# Patient Record
Sex: FEMALE | Race: WHITE | NOT HISPANIC OR LATINO
[De-identification: names, ages, dates, MRNs, and addresses within clinical notes are randomized per-mention and may not be internally consistent; named-entity substitution may affect disease eponyms.]

---

## 2017-02-22 ENCOUNTER — RECORD ABSTRACTING (OUTPATIENT)
Age: 82
End: 2017-02-22

## 2017-02-22 DIAGNOSIS — M17.9 OSTEOARTHRITIS OF KNEE, UNSPECIFIED: ICD-10-CM

## 2017-02-22 DIAGNOSIS — I10 ESSENTIAL (PRIMARY) HYPERTENSION: ICD-10-CM

## 2017-02-22 DIAGNOSIS — Z80.9 FAMILY HISTORY OF MALIGNANT NEOPLASM, UNSPECIFIED: ICD-10-CM

## 2017-02-22 DIAGNOSIS — Z78.9 OTHER SPECIFIED HEALTH STATUS: ICD-10-CM

## 2017-02-22 DIAGNOSIS — Z47.1 AFTERCARE FOLLOWING JOINT REPLACEMENT SURGERY: ICD-10-CM

## 2017-02-22 RX ORDER — POTASSIUM CHLORIDE 600 MG/1
TABLET, FILM COATED, EXTENDED RELEASE ORAL
Refills: 0 | Status: ACTIVE | COMMUNITY

## 2017-02-22 RX ORDER — ATORVASTATIN CALCIUM 80 MG/1
TABLET, FILM COATED ORAL
Refills: 0 | Status: ACTIVE | COMMUNITY

## 2017-02-22 RX ORDER — LEVOTHYROXINE SODIUM 0.17 MG/1
TABLET ORAL
Refills: 0 | Status: ACTIVE | COMMUNITY

## 2017-02-22 RX ORDER — CHROMIUM 200 MCG
TABLET ORAL
Refills: 0 | Status: ACTIVE | COMMUNITY

## 2017-02-22 RX ORDER — ATENOLOL 50 MG/1
TABLET ORAL
Refills: 0 | Status: ACTIVE | COMMUNITY

## 2017-02-22 RX ORDER — LOSARTAN POTASSIUM AND HYDROCHLOROTHIAZIDE 100; 12.5 MG/1; MG/1
TABLET, FILM COATED ORAL
Refills: 0 | Status: ACTIVE | COMMUNITY

## 2017-03-03 ENCOUNTER — APPOINTMENT (OUTPATIENT)
Dept: ORTHOPEDIC SURGERY | Facility: CLINIC | Age: 82
End: 2017-03-03

## 2017-09-29 ENCOUNTER — OTHER (OUTPATIENT)
Age: 82
End: 2017-09-29

## 2017-09-29 ENCOUNTER — APPOINTMENT (OUTPATIENT)
Dept: ORTHOPEDIC SURGERY | Facility: CLINIC | Age: 82
End: 2017-09-29
Payer: MEDICARE

## 2017-09-29 VITALS — WEIGHT: 170 LBS | BODY MASS INDEX: 26.68 KG/M2 | HEIGHT: 67 IN

## 2017-09-29 DIAGNOSIS — Z96.653 PRESENCE OF ARTIFICIAL KNEE JOINT, BILATERAL: ICD-10-CM

## 2017-09-29 DIAGNOSIS — Z60.2 PROBLEMS RELATED TO LIVING ALONE: ICD-10-CM

## 2017-09-29 DIAGNOSIS — Z86.39 PERSONAL HISTORY OF OTHER ENDOCRINE, NUTRITIONAL AND METABOLIC DISEASE: ICD-10-CM

## 2017-09-29 DIAGNOSIS — M17.0 BILATERAL PRIMARY OSTEOARTHRITIS OF KNEE: ICD-10-CM

## 2017-09-29 DIAGNOSIS — Z78.9 OTHER SPECIFIED HEALTH STATUS: ICD-10-CM

## 2017-09-29 DIAGNOSIS — Z87.39 PERSONAL HISTORY OF OTHER DISEASES OF THE MUSCULOSKELETAL SYSTEM AND CONNECTIVE TISSUE: ICD-10-CM

## 2017-09-29 PROCEDURE — 99213 OFFICE O/P EST LOW 20 MIN: CPT

## 2017-09-29 PROCEDURE — 73562 X-RAY EXAM OF KNEE 3: CPT | Mod: 50

## 2017-09-29 RX ORDER — GABAPENTIN 100 MG/1
100 CAPSULE ORAL
Qty: 120 | Refills: 0 | Status: ACTIVE | COMMUNITY
Start: 2017-09-06

## 2017-09-29 RX ORDER — MECLIZINE HYDROCHLORIDE 25 MG/1
25 TABLET ORAL
Qty: 30 | Refills: 0 | Status: ACTIVE | COMMUNITY
Start: 2017-09-25

## 2017-09-29 RX ORDER — POTASSIUM CITRATE 10 MEQ/1
10 MEQ TABLET, EXTENDED RELEASE ORAL
Qty: 200 | Refills: 0 | Status: ACTIVE | COMMUNITY
Start: 2017-09-27

## 2017-09-29 RX ORDER — PREDNISOLONE ACETATE 10 MG/ML
1 SUSPENSION/ DROPS OPHTHALMIC
Qty: 5 | Refills: 0 | Status: ACTIVE | COMMUNITY
Start: 2017-09-17

## 2017-09-29 RX ORDER — TIMOLOL MALEATE 5 MG/ML
0.5 SOLUTION OPHTHALMIC
Qty: 5 | Refills: 0 | Status: ACTIVE | COMMUNITY
Start: 2016-12-06

## 2017-09-29 RX ORDER — LOSARTAN POTASSIUM AND HYDROCHLOROTHIAZIDE 25; 100 MG/1; MG/1
100-25 TABLET ORAL
Qty: 90 | Refills: 0 | Status: ACTIVE | COMMUNITY
Start: 2017-09-19

## 2017-09-29 RX ORDER — POTASSIUM CHLORIDE 750 MG/1
10 TABLET, FILM COATED, EXTENDED RELEASE ORAL
Qty: 90 | Refills: 0 | Status: ACTIVE | COMMUNITY
Start: 2016-06-23

## 2017-09-29 RX ORDER — ATORVASTATIN CALCIUM 40 MG/1
40 TABLET, FILM COATED ORAL
Qty: 90 | Refills: 0 | Status: ACTIVE | COMMUNITY
Start: 2017-05-30

## 2017-09-29 SDOH — SOCIAL STABILITY - SOCIAL INSECURITY: PROBLEMS RELATED TO LIVING ALONE: Z60.2

## 2018-08-02 ENCOUNTER — OUTPATIENT (OUTPATIENT)
Dept: OUTPATIENT SERVICES | Facility: HOSPITAL | Age: 83
LOS: 1 days | Discharge: HOME | End: 2018-08-02
Payer: MEDICARE

## 2018-08-02 DIAGNOSIS — M54.5 LOW BACK PAIN: ICD-10-CM

## 2018-08-02 DIAGNOSIS — Z96.653 PRESENCE OF ARTIFICIAL KNEE JOINT, BILATERAL: Chronic | ICD-10-CM

## 2018-08-02 DIAGNOSIS — Z98.89 OTHER SPECIFIED POSTPROCEDURAL STATES: Chronic | ICD-10-CM

## 2018-08-02 DIAGNOSIS — Z90.5 ACQUIRED ABSENCE OF KIDNEY: Chronic | ICD-10-CM

## 2018-08-02 PROCEDURE — 72100 X-RAY EXAM L-S SPINE 2/3 VWS: CPT | Mod: 26

## 2018-08-21 ENCOUNTER — OUTPATIENT (OUTPATIENT)
Dept: OUTPATIENT SERVICES | Facility: HOSPITAL | Age: 83
LOS: 1 days | Discharge: HOME | End: 2018-08-21
Payer: MEDICARE

## 2018-08-21 ENCOUNTER — APPOINTMENT (OUTPATIENT)
Dept: CT IMAGING | Facility: HOSPITAL | Age: 83
End: 2018-08-21

## 2018-08-21 DIAGNOSIS — Z98.89 OTHER SPECIFIED POSTPROCEDURAL STATES: Chronic | ICD-10-CM

## 2018-08-21 DIAGNOSIS — Z96.653 PRESENCE OF ARTIFICIAL KNEE JOINT, BILATERAL: Chronic | ICD-10-CM

## 2018-08-21 DIAGNOSIS — M54.41 LUMBAGO WITH SCIATICA, RIGHT SIDE: ICD-10-CM

## 2018-08-21 DIAGNOSIS — Z90.5 ACQUIRED ABSENCE OF KIDNEY: Chronic | ICD-10-CM

## 2018-08-21 PROCEDURE — 72131 CT LUMBAR SPINE W/O DYE: CPT | Mod: 26

## 2018-11-09 ENCOUNTER — APPOINTMENT (OUTPATIENT)
Dept: ORTHOPEDIC SURGERY | Facility: CLINIC | Age: 83
End: 2018-11-09

## 2018-12-11 ENCOUNTER — APPOINTMENT (OUTPATIENT)
Dept: NEUROLOGY | Facility: CLINIC | Age: 83
End: 2018-12-11
Payer: MEDICARE

## 2018-12-11 VITALS — HEART RATE: 70 BPM | SYSTOLIC BLOOD PRESSURE: 187 MMHG | DIASTOLIC BLOOD PRESSURE: 109 MMHG | HEIGHT: 67 IN

## 2018-12-11 VITALS — SYSTOLIC BLOOD PRESSURE: 178 MMHG | DIASTOLIC BLOOD PRESSURE: 90 MMHG

## 2018-12-11 DIAGNOSIS — M54.17 RADICULOPATHY, LUMBOSACRAL REGION: ICD-10-CM

## 2018-12-11 PROCEDURE — 99204 OFFICE O/P NEW MOD 45 MIN: CPT

## 2018-12-11 RX ORDER — TIZANIDINE 2 MG/1
2 TABLET ORAL
Qty: 30 | Refills: 5 | Status: ACTIVE | COMMUNITY
Start: 2018-12-11 | End: 1900-01-01

## 2019-01-02 ENCOUNTER — APPOINTMENT (OUTPATIENT)
Dept: ORTHOPEDIC SURGERY | Facility: CLINIC | Age: 84
End: 2019-01-02

## 2019-02-19 ENCOUNTER — RX RENEWAL (OUTPATIENT)
Age: 84
End: 2019-02-19

## 2019-02-19 RX ORDER — CYCLOBENZAPRINE HYDROCHLORIDE 5 MG/1
5 TABLET, FILM COATED ORAL
Qty: 60 | Refills: 1 | Status: ACTIVE | COMMUNITY
Start: 2018-12-18 | End: 1900-01-01

## 2019-04-15 ENCOUNTER — APPOINTMENT (OUTPATIENT)
Dept: CARDIOLOGY | Facility: CLINIC | Age: 84
End: 2019-04-15

## 2019-10-02 PROBLEM — Z60.2 PERSON LIVING ALONE: Status: ACTIVE | Noted: 2017-09-29

## 2020-07-06 ENCOUNTER — APPOINTMENT (OUTPATIENT)
Dept: ORTHOPEDIC SURGERY | Facility: CLINIC | Age: 85
End: 2020-07-06
Payer: MEDICARE

## 2020-07-06 VITALS — BODY MASS INDEX: 27 KG/M2 | HEIGHT: 66 IN | WEIGHT: 168 LBS

## 2020-07-06 DIAGNOSIS — M54.42 LUMBAGO WITH SCIATICA, LEFT SIDE: ICD-10-CM

## 2020-07-06 DIAGNOSIS — G89.29 LUMBAGO WITH SCIATICA, LEFT SIDE: ICD-10-CM

## 2020-07-06 DIAGNOSIS — M25.552 PAIN IN LEFT HIP: ICD-10-CM

## 2020-07-06 DIAGNOSIS — M54.41 LUMBAGO WITH SCIATICA, LEFT SIDE: ICD-10-CM

## 2020-07-06 PROCEDURE — 73502 X-RAY EXAM HIP UNI 2-3 VIEWS: CPT | Mod: LT

## 2020-07-06 PROCEDURE — 72100 X-RAY EXAM L-S SPINE 2/3 VWS: CPT

## 2020-07-06 PROCEDURE — 99214 OFFICE O/P EST MOD 30 MIN: CPT

## 2020-07-06 NOTE — HISTORY OF PRESENT ILLNESS
[de-identified] : This is a very nice 85 year old woman experiencing left hip pain x one week, moderate in intensity. She is referred by Dr Escudero (PCP).  She is accompanied by her son for today's visit.  She denies any injury or trauma.  She has a known history of severe spinal stenosis with right lower extremity radiculopathy.  She states that the pain began approximately 1 week ago and is severe pain in the left groin radiation to the buttock and inner thigh to the knee.  She has been taking Tylenol #3 which has helped.  The pain is actually 100% relief resolved today. The pain is exacerbated by sitting in a recliner.  Twisting motions do not reproduce her pain.  Lying flat does help.    Medications she has tried include: Tylenol with codeine with relief.  She has not tried any physical therapy for this yet. She has been using a walker for balance issues, primarily outside  the house.  She has not had a prior injections into the hip.   She denies numbness and tingling in the extremity. The pain substantially limits activities of daily living. Walking tolerance is reduced.

## 2020-07-06 NOTE — PHYSICAL EXAM
[de-identified] : AP and lateral x-rays of the left hip, pelvis, and femur were ordered and taken in the office and demonstrate no evidence of degenerative joint disease of the hip with maintained joint space and no evidence of fractures or other intraarticular pathology.\par \par AP and lateral radiographs of the lumbar spine were ordered and obtained the office demonstrate severe degenerative disc disease of the lumbar spine but no evidence of spondylolysis or spondylolisthesis. [de-identified] : Patient is well nourished, well-developed, in no acute distress, with appropriate mood and affect. The patient is oriented to time, place, and person. Respirations are even and unlabored. Gait evaluation does not reveal a limp. There is no inguinal adenopathy. Examination of the contralateral hip shows normal range of motion, strength, no tenderness, and intact skin. The affected limb is well-perfused and showed 2+ dp/pt pulses, without skin lesions, shows a grossly normal motor and sensory examination. Examination of the hip shows no skin lesions. Hip motion is full and painless from 0-90 degrees extension to flexion, 20 degrees adduction and 20 degrees abduction, and 15 degrees internal and 30 degrees external rotation. Leg lengths are approximately equal. FADIR is negative and MARLON is negative. Stinchfield test is negative. Both hips are stable and muscle strength is normal with good strength with resisted abduction and adduction. Pedal pulses are palpable.  Examination of the lumbar spine reveals full range of motion without pain.  Mild tenderness palpation about the left paraspinal region.  There is no tenderness to palpation of the osseous structures or paravertebral soft tissues. There is no muscle spasm. Straight leg raise is negative bilaterally.\par \par

## 2020-07-06 NOTE — DISCUSSION/SUMMARY
[de-identified] : This patient has left lower extremity radiculopathy secondary to severe spinal stenosis.  Her left hip examination and radiographs are normal.  The patient is not an appropriate candidate for surgical intervention at this time. An extensive discussion was conducted on the natural history of the disease and the variety of surgical and non-surgical options available to the patient including, but not limited to non-steroidal anti-inflammatory medications, steroid injections, physical therapy, maintenance of ideal body weight, and reduction of activity.  Referred to physiatry for further management of her bilateral lower extremity radiculopathy.  She they will consider epidural steroid injections or selective nerve root injections.  The patient will schedule an appointment as needed.\par 
emani all pertinent systems normal

## 2020-07-17 ENCOUNTER — APPOINTMENT (OUTPATIENT)
Dept: RADIOLOGY | Facility: CLINIC | Age: 85
End: 2020-07-17
Payer: MEDICARE

## 2020-07-17 ENCOUNTER — OUTPATIENT (OUTPATIENT)
Dept: OUTPATIENT SERVICES | Facility: HOSPITAL | Age: 85
LOS: 1 days | End: 2020-07-17
Payer: MEDICARE

## 2020-07-17 ENCOUNTER — APPOINTMENT (OUTPATIENT)
Dept: CT IMAGING | Facility: CLINIC | Age: 85
End: 2020-07-17
Payer: MEDICARE

## 2020-07-17 DIAGNOSIS — Z98.89 OTHER SPECIFIED POSTPROCEDURAL STATES: Chronic | ICD-10-CM

## 2020-07-17 DIAGNOSIS — Z90.5 ACQUIRED ABSENCE OF KIDNEY: Chronic | ICD-10-CM

## 2020-07-17 DIAGNOSIS — Z00.8 ENCOUNTER FOR OTHER GENERAL EXAMINATION: ICD-10-CM

## 2020-07-17 DIAGNOSIS — Z96.653 PRESENCE OF ARTIFICIAL KNEE JOINT, BILATERAL: Chronic | ICD-10-CM

## 2020-07-17 PROCEDURE — 72131 CT LUMBAR SPINE W/O DYE: CPT | Mod: 26

## 2020-07-17 PROCEDURE — 72131 CT LUMBAR SPINE W/O DYE: CPT

## 2020-07-17 PROCEDURE — 73521 X-RAY EXAM HIPS BI 2 VIEWS: CPT | Mod: 26

## 2020-07-17 PROCEDURE — 73521 X-RAY EXAM HIPS BI 2 VIEWS: CPT

## 2021-02-25 ENCOUNTER — FORM ENCOUNTER (OUTPATIENT)
Age: 86
End: 2021-02-25

## 2021-02-26 ENCOUNTER — TRANSCRIPTION ENCOUNTER (OUTPATIENT)
Age: 86
End: 2021-02-26

## 2021-02-27 ENCOUNTER — OUTPATIENT (OUTPATIENT)
Dept: OUTPATIENT SERVICES | Facility: HOSPITAL | Age: 86
LOS: 1 days | End: 2021-02-27
Payer: MEDICARE

## 2021-02-27 ENCOUNTER — APPOINTMENT (OUTPATIENT)
Dept: DISASTER EMERGENCY | Facility: HOSPITAL | Age: 86
End: 2021-02-27

## 2021-02-27 VITALS
HEIGHT: 67 IN | OXYGEN SATURATION: 93 % | RESPIRATION RATE: 16 BRPM | SYSTOLIC BLOOD PRESSURE: 147 MMHG | HEART RATE: 75 BPM | DIASTOLIC BLOOD PRESSURE: 87 MMHG | WEIGHT: 160.28 LBS | TEMPERATURE: 98 F

## 2021-02-27 VITALS
SYSTOLIC BLOOD PRESSURE: 125 MMHG | HEART RATE: 78 BPM | OXYGEN SATURATION: 95 % | TEMPERATURE: 98 F | RESPIRATION RATE: 16 BRPM | DIASTOLIC BLOOD PRESSURE: 75 MMHG

## 2021-02-27 DIAGNOSIS — U07.1 COVID-19: ICD-10-CM

## 2021-02-27 DIAGNOSIS — Z90.5 ACQUIRED ABSENCE OF KIDNEY: Chronic | ICD-10-CM

## 2021-02-27 DIAGNOSIS — Z96.653 PRESENCE OF ARTIFICIAL KNEE JOINT, BILATERAL: Chronic | ICD-10-CM

## 2021-02-27 DIAGNOSIS — Z98.89 OTHER SPECIFIED POSTPROCEDURAL STATES: Chronic | ICD-10-CM

## 2021-02-27 PROCEDURE — M0239: CPT

## 2021-02-27 RX ORDER — SODIUM CHLORIDE 9 MG/ML
250 INJECTION INTRAMUSCULAR; INTRAVENOUS; SUBCUTANEOUS
Refills: 0 | Status: DISCONTINUED | OUTPATIENT
Start: 2021-02-27 | End: 2021-03-13

## 2021-02-27 RX ORDER — BAMLANIVIMAB 35 MG/ML
700 INJECTION, SOLUTION INTRAVENOUS ONCE
Refills: 0 | Status: COMPLETED | OUTPATIENT
Start: 2021-02-27 | End: 2021-02-27

## 2021-02-27 RX ADMIN — BAMLANIVIMAB 270 MILLIGRAM(S): 35 INJECTION, SOLUTION INTRAVENOUS at 08:48

## 2021-02-27 RX ADMIN — SODIUM CHLORIDE 25 MILLILITER(S): 9 INJECTION INTRAMUSCULAR; INTRAVENOUS; SUBCUTANEOUS at 08:49

## 2021-02-27 NOTE — CHART NOTE - NSCHARTNOTEFT_GEN_A_CORE
CC: Monoclonal Antibody Infusion/COVID 19 Positive  86yo Female with PMHx HTN, HLD, positive Covid 19 on 2/20 with symptoms of sore throat, cough, malaise referred by Nghia Gustafson for monoclonal antibody infusion.      exam/findings:  T(C): --  HR: --  BP: --  RR: --  SpO2: --    PE:   Appearance: NAD	  HEENT:   Normal oral mucosa,   Lymphatic: No lymphadenopathy  Cardiovascular: Normal S1 S2, No JVD, No murmurs, No edema  Respiratory: Lungs clear to auscultation	  Gastrointestinal:  Soft, Non-tender, + BS	  Skin: warm and dry  Neurologic: Non-focal  Extremities: Normal range of motion, no calf tenderness or edema    ASSESSMENT:  Pt is a 85 years old female, Covid 19 Positive referred by Dr. Duarte who presents to infusion center for Monoclonal antibody infusion Bamlanivimab.  Symptoms/ Criteria: sore throat, cough, malaise  Risk Profile includes: age >65    PLAN:  - infusion procedure explained to patient   -Consent for monoclonal antibody infusion obtained   - Risk & benefits discussed/all questions answered  -infuse Bamlanivimab 700mg IV over one hour   -observe patient for one hour post infusion    I have reviewed the Bamlanivimab Emergency Use Authorization (EAU) and I have provided the patient or patient's caregiver with the following information:  1. FDA has authorized emergency use of Bamlanivimab, which is not FDA-approved biologic product.  2. The patient or patient's caregiver has the option to accept or refuse administration of Bamlanivimab  3. The significant known and benefits are unknown.  4. Information on available alternative treatments and risks and benefits of those alternatives.    Discharge:  Patient tolerated infusion well denies complaints of chest pain/SOB/dizziness/ palps  Vital signs --- for discharge home at 1040  D/C instructions given/ fact sheet included.  Patient to follow-up with PCP as needed. CC: Monoclonal Antibody Infusion/COVID 19 Positive  84yo Female with PMHx HTN, HLD, positive Covid 19 on 2/20 with symptoms of sore throat, cough, malaise referred by Nghia Gustafson for monoclonal antibody infusion.      exam/findings:  Vital Signs Last 24 Hrs  T(C): 36.9 (27 Feb 2021 09:40), Max: 37.3 (27 Feb 2021 08:50)  T(F): 98.5 (27 Feb 2021 09:40), Max: 99.2 (27 Feb 2021 08:50)  HR: 75 (27 Feb 2021 09:40) (71 - 75)  BP: 112/61 (27 Feb 2021 09:40) (112/61 - 147/87)  RR: 17 (27 Feb 2021 09:40) (16 - 17)  SpO2: 95% (27 Feb 2021 09:40) (91% - 95%)    PE:   Appearance: NAD	  HEENT:   Normal oral mucosa,   Lymphatic: No lymphadenopathy  Cardiovascular: Normal S1 S2, No JVD, No murmurs, No edema  Respiratory: Lungs clear to auscultation	  Gastrointestinal:  Soft, Non-tender, + BS	  Skin: warm and dry  Neurologic: Non-focal  Extremities: Normal range of motion, no calf tenderness or edema    ASSESSMENT:  Pt is a 85 years old female, Covid 19 Positive referred by Dr. Duarte who presents to infusion center for Monoclonal antibody infusion Bamlanivimab.  Symptoms/ Criteria: sore throat, cough, malaise  Risk Profile includes: age >65    PLAN:  - infusion procedure explained to patient   -Consent for monoclonal antibody infusion obtained   - Risk & benefits discussed/all questions answered  -infuse Bamlanivimab 700mg IV over one hour   -observe patient for one hour post infusion    I have reviewed the Bamlanivimab Emergency Use Authorization (EAU) and I have provided the patient or patient's caregiver with the following information:  1. FDA has authorized emergency use of Bamlanivimab, which is not FDA-approved biologic product.  2. The patient or patient's caregiver has the option to accept or refuse administration of Bamlanivimab  3. The significant known and benefits are unknown.  4. Information on available alternative treatments and risks and benefits of those alternatives.    Discharge:  Patient tolerated infusion well denies complaints of chest pain/SOB/dizziness/ palps  Vital signs stable for discharge home at 1040  D/C instructions given/ fact sheet included.  Patient to follow-up with PCP as needed.

## 2021-02-28 ENCOUNTER — TRANSCRIPTION ENCOUNTER (OUTPATIENT)
Age: 86
End: 2021-02-28

## 2021-03-01 ENCOUNTER — TRANSCRIPTION ENCOUNTER (OUTPATIENT)
Age: 86
End: 2021-03-01

## 2021-03-06 ENCOUNTER — EMERGENCY (EMERGENCY)
Facility: HOSPITAL | Age: 86
LOS: 1 days | Discharge: HOME | End: 2021-03-06
Attending: EMERGENCY MEDICINE
Payer: MEDICARE

## 2021-03-06 VITALS
HEIGHT: 67 IN | HEART RATE: 82 BPM | WEIGHT: 160.06 LBS | SYSTOLIC BLOOD PRESSURE: 136 MMHG | OXYGEN SATURATION: 97 % | TEMPERATURE: 98 F | RESPIRATION RATE: 20 BRPM | DIASTOLIC BLOOD PRESSURE: 79 MMHG

## 2021-03-06 VITALS
DIASTOLIC BLOOD PRESSURE: 95 MMHG | SYSTOLIC BLOOD PRESSURE: 167 MMHG | OXYGEN SATURATION: 96 % | RESPIRATION RATE: 18 BRPM | HEART RATE: 88 BPM | TEMPERATURE: 98 F

## 2021-03-06 DIAGNOSIS — Z98.89 OTHER SPECIFIED POSTPROCEDURAL STATES: Chronic | ICD-10-CM

## 2021-03-06 DIAGNOSIS — Z96.653 PRESENCE OF ARTIFICIAL KNEE JOINT, BILATERAL: Chronic | ICD-10-CM

## 2021-03-06 DIAGNOSIS — Z90.5 ACQUIRED ABSENCE OF KIDNEY: Chronic | ICD-10-CM

## 2021-03-06 LAB
ALBUMIN SERPL ELPH-MCNC: 3.7 G/DL — SIGNIFICANT CHANGE UP (ref 3.3–5)
ALP SERPL-CCNC: 78 U/L — SIGNIFICANT CHANGE UP (ref 40–120)
ALT FLD-CCNC: 31 U/L — SIGNIFICANT CHANGE UP (ref 10–45)
ANION GAP SERPL CALC-SCNC: 11 MMOL/L — SIGNIFICANT CHANGE UP (ref 5–17)
AST SERPL-CCNC: 46 U/L — HIGH (ref 10–40)
BASOPHILS # BLD AUTO: 0.04 K/UL — SIGNIFICANT CHANGE UP (ref 0–0.2)
BASOPHILS NFR BLD AUTO: 0.5 % — SIGNIFICANT CHANGE UP (ref 0–2)
BILIRUB SERPL-MCNC: 0.3 MG/DL — SIGNIFICANT CHANGE UP (ref 0.2–1.2)
BUN SERPL-MCNC: 19 MG/DL — SIGNIFICANT CHANGE UP (ref 7–23)
CALCIUM SERPL-MCNC: 10.9 MG/DL — HIGH (ref 8.4–10.5)
CHLORIDE SERPL-SCNC: 103 MMOL/L — SIGNIFICANT CHANGE UP (ref 96–108)
CO2 SERPL-SCNC: 26 MMOL/L — SIGNIFICANT CHANGE UP (ref 22–31)
CREAT SERPL-MCNC: 1.04 MG/DL — SIGNIFICANT CHANGE UP (ref 0.5–1.3)
D DIMER BLD IA.RAPID-MCNC: 625 NG/ML DDU — HIGH
EOSINOPHIL # BLD AUTO: 0.16 K/UL — SIGNIFICANT CHANGE UP (ref 0–0.5)
EOSINOPHIL NFR BLD AUTO: 1.8 % — SIGNIFICANT CHANGE UP (ref 0–6)
GLUCOSE SERPL-MCNC: 118 MG/DL — HIGH (ref 70–99)
HCT VFR BLD CALC: 41.1 % — SIGNIFICANT CHANGE UP (ref 34.5–45)
HGB BLD-MCNC: 13.5 G/DL — SIGNIFICANT CHANGE UP (ref 11.5–15.5)
IMM GRANULOCYTES NFR BLD AUTO: 1.7 % — HIGH (ref 0–1.5)
LYMPHOCYTES # BLD AUTO: 0.85 K/UL — LOW (ref 1–3.3)
LYMPHOCYTES # BLD AUTO: 9.8 % — LOW (ref 13–44)
MAGNESIUM SERPL-MCNC: 1.7 MG/DL — SIGNIFICANT CHANGE UP (ref 1.6–2.6)
MCHC RBC-ENTMCNC: 31.9 PG — SIGNIFICANT CHANGE UP (ref 27–34)
MCHC RBC-ENTMCNC: 32.8 GM/DL — SIGNIFICANT CHANGE UP (ref 32–36)
MCV RBC AUTO: 97.2 FL — SIGNIFICANT CHANGE UP (ref 80–100)
MONOCYTES # BLD AUTO: 0.65 K/UL — SIGNIFICANT CHANGE UP (ref 0–0.9)
MONOCYTES NFR BLD AUTO: 7.5 % — SIGNIFICANT CHANGE UP (ref 2–14)
NEUTROPHILS # BLD AUTO: 6.82 K/UL — SIGNIFICANT CHANGE UP (ref 1.8–7.4)
NEUTROPHILS NFR BLD AUTO: 78.7 % — HIGH (ref 43–77)
NRBC # BLD: 0 /100 WBCS — SIGNIFICANT CHANGE UP (ref 0–0)
PLATELET # BLD AUTO: 362 K/UL — SIGNIFICANT CHANGE UP (ref 150–400)
POTASSIUM SERPL-MCNC: 4.4 MMOL/L — SIGNIFICANT CHANGE UP (ref 3.5–5.3)
POTASSIUM SERPL-SCNC: 4.4 MMOL/L — SIGNIFICANT CHANGE UP (ref 3.5–5.3)
PROT SERPL-MCNC: 7 G/DL — SIGNIFICANT CHANGE UP (ref 6–8.3)
RBC # BLD: 4.23 M/UL — SIGNIFICANT CHANGE UP (ref 3.8–5.2)
RBC # FLD: 14.3 % — SIGNIFICANT CHANGE UP (ref 10.3–14.5)
SODIUM SERPL-SCNC: 140 MMOL/L — SIGNIFICANT CHANGE UP (ref 135–145)
TROPONIN T, HIGH SENSITIVITY RESULT: 28 NG/L — SIGNIFICANT CHANGE UP (ref 0–51)
TROPONIN T, HIGH SENSITIVITY RESULT: 30 NG/L — SIGNIFICANT CHANGE UP (ref 0–51)
WBC # BLD: 8.67 K/UL — SIGNIFICANT CHANGE UP (ref 3.8–10.5)
WBC # FLD AUTO: 8.67 K/UL — SIGNIFICANT CHANGE UP (ref 3.8–10.5)

## 2021-03-06 PROCEDURE — 99285 EMERGENCY DEPT VISIT HI MDM: CPT | Mod: CS

## 2021-03-06 PROCEDURE — 93005 ELECTROCARDIOGRAM TRACING: CPT

## 2021-03-06 PROCEDURE — 71045 X-RAY EXAM CHEST 1 VIEW: CPT

## 2021-03-06 PROCEDURE — 93970 EXTREMITY STUDY: CPT | Mod: 26

## 2021-03-06 PROCEDURE — 99285 EMERGENCY DEPT VISIT HI MDM: CPT | Mod: 25

## 2021-03-06 PROCEDURE — 83735 ASSAY OF MAGNESIUM: CPT

## 2021-03-06 PROCEDURE — 85025 COMPLETE CBC W/AUTO DIFF WBC: CPT

## 2021-03-06 PROCEDURE — 80053 COMPREHEN METABOLIC PANEL: CPT

## 2021-03-06 PROCEDURE — 85379 FIBRIN DEGRADATION QUANT: CPT

## 2021-03-06 PROCEDURE — 36000 PLACE NEEDLE IN VEIN: CPT

## 2021-03-06 PROCEDURE — 84484 ASSAY OF TROPONIN QUANT: CPT

## 2021-03-06 PROCEDURE — 93970 EXTREMITY STUDY: CPT

## 2021-03-06 PROCEDURE — 71045 X-RAY EXAM CHEST 1 VIEW: CPT | Mod: 26

## 2021-03-06 PROCEDURE — 93010 ELECTROCARDIOGRAM REPORT: CPT

## 2021-03-06 RX ORDER — ASPIRIN/CALCIUM CARB/MAGNESIUM 324 MG
162 TABLET ORAL ONCE
Refills: 0 | Status: COMPLETED | OUTPATIENT
Start: 2021-03-06 | End: 2021-03-06

## 2021-03-06 RX ADMIN — Medication 162 MILLIGRAM(S): at 11:20

## 2021-03-06 NOTE — ED PROVIDER NOTE - PHYSICAL EXAMINATION
Federico JULIO MD PGY3:   PHYSICAL EXAM:    GENERAL: NAD, well-developed  HEENT:  Atraumatic, Normocephalic  CHEST/LUNG: Chest rise equal bilaterally. CTAB.   HEART: Regular rate and rhythm. No murmurs or rubs.   ABDOMEN: Soft, Nontender, Nondistended  EXTREMITIES:  2+ Peripheral Pulses.  PSYCH: A&Ox3  SKIN: No obvious rashes or lesions Federico JULIO MD PGY3:   PHYSICAL EXAM:    GENERAL: NAD, well-developed  HEENT:  Atraumatic, Normocephalic  CHEST/LUNG: Chest rise equal bilaterally. CTAB. No Increased WOB. No tachypnea.   HEART: Regular rate and rhythm. No murmurs or rubs.   ABDOMEN: Soft, Nontender, Nondistended  EXTREMITIES:  2+ Peripheral Pulses.  PSYCH: A&Ox3  SKIN: No obvious rashes or lesions

## 2021-03-06 NOTE — ED PROVIDER NOTE - OBJECTIVE STATEMENT
Federico JULIO MD PGY3: 85 F Federico JULIO MD PGY3: 85 F hx HTN, HLD known COVID+ two weeks ago here with worsening SOB x 2 days. No cough, no fever. States that the SOB is mild "like I just can't take a deep breath". No LE swelling, no hx of VTE. No nausea, vomiting. Tolerating PO well. Able to carry out ADLs but states "this is not normal for me".

## 2021-03-06 NOTE — ED PROVIDER NOTE - CLINICAL SUMMARY MEDICAL DECISION MAKING FREE TEXT BOX
Federico JULIO MD PGY3: 85 F recent COVID 2 weeks ago here for generalized SOB Federico JULIO MD PGY3: 85 F recent COVID 2 weeks ago here for generalized SOB with mild GARCIA, able to carry out ADLs. WIll obtain cardiac and PE workup for ACS, PE, PNA.  WIll reassess.

## 2021-03-06 NOTE — ED PROVIDER NOTE - PSH
S/P   x 4  S/P knee replacement, bilateral    S/p nephrectomy  left (  )  S/P tonsillectomy  in childhood

## 2021-03-06 NOTE — ED ADULT NURSE REASSESSMENT NOTE - NS ED NURSE REASSESS COMMENT FT1
Pt AAOx4, NAD, resp nonlabored, resting comfortably in bed maintained on cardiac monitor. Pt denies headache, dizziness, chest pain, palpitations, SOB, abd pain, n/v/d, urinary symptoms, fevers, chills, weakness at this time. Pt awaiting lab results. Safety maintained with call bell within reach.

## 2021-03-06 NOTE — ED PROVIDER NOTE - PATIENT PORTAL LINK FT
You can access the FollowMyHealth Patient Portal offered by [facility] by registering at the following website: [email or web address]. By joining [employer]’s FollowMyHealth portal, you will also be able to view your health information using other applications (apps) compatible with our system.

## 2021-03-06 NOTE — ED PROVIDER NOTE - ATTENDING CONTRIBUTION TO CARE
Private Physician Charlie Duarte PCP [facility]  85y female [facility] COVID 92/20/21) Hld, HTN,Hypothyrodism, Renal ca sp nephrectomy (L), lissy TKR, C-sec, tonsilectomy. Pt comes to ed c/o liang after receiving Monoclonal ab last week. Pt has liang walking around house. No fever,chills.cough,sputum,hemoptysis,cp,abd pain, nvdc,bodyaches. PE WDWN female awake alert nad , ncat neck supple chest clear anterior & posterior cv no rubs, gallops or murmurs neuro no focal defcts msk no swelling edema lower extr ttp.  Kodi Oden MD, Facep Private Physician 	Nghia NDIAYE [phone]  85y female [facility] COVID 92/20/21) Hld, HTN,Hypothyrodism, Renal ca sp nephrectomy (L), lissy TKR, C-sec, tonsilectomy. Pt comes to ed c/o liang after receiving Monoclonal ab last week. Pt has liang walking around house. No fever,chills.cough,sputum,hemoptysis,cp,abd pain, nvdc,bodyaches. PE WDWN female awake alert nad , ncat neck supple chest clear anterior & posterior cv no rubs, gallops or murmurs neuro no focal defcts msk no swelling edema lower extr ttp.  Kodi Oden MD, Facep

## 2021-03-06 NOTE — ED PROVIDER NOTE - PROGRESS NOTE DETAILS
Discussed with Private Physician not staff. does not refer to anyone  Kodi Oden MD, Facep ap-  pt signed out to me by Dr. Oden, spoke w/ patient regarding plan to obtain CTA, pt states she will not have a CTA due to concerns of having 1 kidney, discussed r/b of duplex bilateral lower legs, L>R leg for possible clot, pt w/ no active SOB, discussed plan for possible v/q scan, pt states that "I just want to go home" ap- spoke w/ jaqueline Caputo, pts son informed of diagnostic difficulty currently awaiting, duplex of bilateral lower extremity if negative; aware of need for v/q scan, José Miguel Daniels DO PGY3 - pt wants to leave before US officially read. Understands risks of ofelia gprior to official report. Will fu on result and call pt if any concern on read. DC

## 2021-03-06 NOTE — ED ADULT NURSE NOTE - NSIMPLEMENTINTERV_GEN_ALL_ED
Implemented All Fall Risk Interventions:  [place] to call system. Call bell, personal items and telephone within reach. Instruct patient to call for assistance. Room bathroom lighting operational. Non-slip footwear when patient is off stretcher. Physically safe environment: no spills, clutter or unnecessary equipment. Stretcher in lowest position, wheels locked, appropriate side rails in place. Provide visual cue, wrist band, yellow gown, etc. Monitor gait and stability. Monitor for mental status changes and reorient to person, place, and time. Review medications for side effects contributing to fall risk. Reinforce activity limits and safety measures with patient and family.

## 2021-03-06 NOTE — ED ADULT NURSE NOTE - NS ED NURSE RECORD ANOTHER VITAL SIGN
Complaint: requests water    Pain:  Pain Assessment  Pain Assessment: 0-10  Pain Level: 4  RASS Score: Drowsy - Patient awakens with sustained eye opening and eye contact    Reason for Referral  Marcelo Chance was referred for a bedside swallow evaluation to assess the efficiency of his swallow function, identify signs and symptoms of aspiration and make recommendations regarding safe dietary consistencies, effective compensatory strategies, and safe eating environment. Impression  Dysphagia Diagnosis: Mild to moderate oral stage dysphagia  Dysphagia Outcome Severity Scale: Level 5: Mild dysphagia- Distant supervision. May need one diet consistency restricted     Treatment Plan  Requires SLP Intervention: Yes  Duration/Frequency of Treatment: monitor 1-2x/LOS  D/C Recommendations: To be determined       Recommended Diet and Intervention  Diet Solids Recommendation: Dysphagia Minced and Moist (Dysphagia II)  Liquid Consistency Recommendation: Thin  Recommended Form of Meds: PO  Recommendations: Assist feed  Therapeutic Interventions: Diet tolerance monitoring;Patient/Family education; Therapeutic PO trials with SLP    Compensatory Swallowing Strategies  Compensatory Swallowing Strategies: Upright as possible for all oral intake;Eat/Feed slowly    Treatment/Goals  Short-term Goals  Timeframe for Short-term Goals: length of admission  Goal 1: Pt will tolerate dysphagia II diet/thin liquids with adequate oral manipulation and no s/s aspiration. Goal 2: Pt/caregivers will indicate understanding of all recommendations. General  Chart Reviewed: Yes  Behavior/Cognition: Alert;Confused; Cooperative; Requires cueing  Follows Directions: Simple  Dentition: Poor dental/oral hygeine  Patient Positioning: Upright in bed  Prior Dysphagia History: none known prior to admission  Consistencies Administered: Reg solid; Dysphagia Minced and Moist (Dysphagia II); Dysphagia Pureed (Dysphagia I); Thin - teaspoon; Thin - cup; Thin - straw;Ice Chips           Vision/Hearing  Vision  Vision: Within Functional Limits  Hearing  Hearing: Within functional limits    Oral Motor Deficits  Oral/Motor  Oral Motor: Exceptions to [facility]    Oral Phase Dysfunction  Oral Phase  Oral Phase: Exceptions     Indicators of Pharyngeal Phase Dysfunction   Pharyngeal Phase  Pharyngeal Phase: WFL    Prognosis  Prognosis  Prognosis for safe diet advancement: guarded  Individuals consulted  Consulted and agree with results and recommendations: Patient;RN    Education  Patient Education: results, recommendations  Patient Education Response: Needs reinforcement  Safety Devices in place: Yes  Type of devices:  All fall risk precautions in place       Therapy Time  SLP Individual Minutes  Time In: 6760  Time Out: 1400  Minutes: 600 [facility], [address] [place] [address] Road  3/16/2020 2:04 PM Yes

## 2021-03-06 NOTE — ED ADULT NURSE NOTE - OBJECTIVE STATEMENT
84 y/o female hx HTN, HLD, hypothyroidism, renal CA presents to the ED from home c/o GARCIA s/p Monoclonal infusion. Pt states received infusion last week, has had worsening GARCIA, recently COVID+ x 3 weeks ago, no longer having symptoms. Denies fever, chills, n/v, weakness, abd pain, diarrhea/constipation, numbness/tingling, urinary s/s. Pt A&Ox3, in no respiratory distress, no CP, well appearing, no increased WOB, no cough, sinus rhythm on cardiac monitor. PT safety maintained, call bell within reach and bed in the lowest position.

## 2021-03-06 NOTE — ED PROVIDER NOTE - NSFOLLOWUPINSTRUCTIONS_ED_ALL_ED_FT
Please follow up with your doctor this week for reevaluation    Return for any new/worsening symtpoms or any other concern to you    You are still pending your official ultrasound result - if there is any abnormal result we will call you with your result

## 2021-03-15 ENCOUNTER — APPOINTMENT (OUTPATIENT)
Dept: PULMONOLOGY | Facility: CLINIC | Age: 86
End: 2021-03-15
Payer: MEDICARE

## 2021-03-15 VITALS
TEMPERATURE: 98.2 F | DIASTOLIC BLOOD PRESSURE: 81 MMHG | HEART RATE: 78 BPM | OXYGEN SATURATION: 95 % | SYSTOLIC BLOOD PRESSURE: 170 MMHG

## 2021-03-15 DIAGNOSIS — Z86.39 PERSONAL HISTORY OF OTHER ENDOCRINE, NUTRITIONAL AND METABOLIC DISEASE: ICD-10-CM

## 2021-03-15 DIAGNOSIS — R25.1 TREMOR, UNSPECIFIED: ICD-10-CM

## 2021-03-15 DIAGNOSIS — U07.1 COVID-19: ICD-10-CM

## 2021-03-15 DIAGNOSIS — Z87.891 PERSONAL HISTORY OF NICOTINE DEPENDENCE: ICD-10-CM

## 2021-03-15 PROCEDURE — 99204 OFFICE O/P NEW MOD 45 MIN: CPT

## 2021-03-15 PROCEDURE — 99072 ADDL SUPL MATRL&STAF TM PHE: CPT

## 2021-03-15 NOTE — PHYSICAL EXAM
[No Acute Distress] : no acute distress [Well Nourished] : well nourished [Normal Rate/Rhythm] : normal rate/rhythm [Normal S1, S2] : normal s1, s2 [No Murmurs] : no murmurs [No Resp Distress] : no resp distress [No Acc Muscle Use] : no acc muscle use [Clear to Auscultation Bilaterally] : clear to auscultation bilaterally [Oriented x3] : oriented x3

## 2021-03-15 NOTE — PROCEDURE
[FreeTextEntry1] : \par EXAM:  DUPLEX SCAN EXT VEINS LOWER BI                      \par \par \par PROCEDURE DATE:  03/06/2021  \par \par \par \par \par INTERPRETATION:  CLINICAL INFORMATION: Bilateral lower extremity weakness, Covid positive.\par \par COMPARISON: None available.\par \par TECHNIQUE: Duplex sonography of the BILATERAL LOWER extremity veins with color and spectral Doppler, with and without compression.\par \par FINDINGS:\par \par There is normal compressibility of the bilateral common femoral, femoral and popliteal veins.\par Doppler examination shows normal spontaneous and phasic flow.\par Calf veins were not visualized.\par \par IMPRESSION:\par \par No evidence of deep venous thrombosis in either lower extremity though calf veins were not visualized.\par

## 2021-03-15 NOTE — HISTORY OF PRESENT ILLNESS
[Former] : former [TextBox_4] : JOVITA KITCHEN is a 85 year old female who presents for intermittent episodes of 'shaking inside" \par has been ongoing since prior to covid 19\par got Covid 19 in feb\par s/p MAB 2/27/21\par \par went to the [facility] ER last week- for the shaky feeling, cant take a deep breath\par s/p xray and US dopplers\par dimer was high\par pulse ox remained > 95%\par no cough, no wheezing\par never seen pulm\par former smoker\par \par PMH: HTN, HLD, history of nephrectomy for a 'mass'\par \par

## 2021-03-15 NOTE — ASSESSMENT
[FreeTextEntry1] : formal pfts\par son willl bring us the results of her PCR\par US dopplers and xray reviewed\par can repeat xray in future- to see if resolution in RLL base\par dimer was high\par will repeat it to make sure trendign down\par if it is not- may need VQ scan\par discussed with patient & son\par

## 2021-03-17 ENCOUNTER — NON-APPOINTMENT (OUTPATIENT)
Age: 86
End: 2021-03-17

## 2021-03-17 LAB — DEPRECATED D DIMER PPP IA-ACNC: 873 NG/ML DDU

## 2021-04-05 ENCOUNTER — APPOINTMENT (OUTPATIENT)
Dept: PULMONOLOGY | Facility: CLINIC | Age: 86
End: 2021-04-05

## 2021-04-27 ENCOUNTER — APPOINTMENT (OUTPATIENT)
Dept: CARDIOLOGY | Facility: CLINIC | Age: 86
End: 2021-04-27

## 2021-05-08 ENCOUNTER — INPATIENT (INPATIENT)
Facility: HOSPITAL | Age: 86
LOS: 4 days | Discharge: NURSING, REHAB OR LONG-TERM CARE | DRG: 872 | End: 2021-05-13
Attending: STUDENT IN AN ORGANIZED HEALTH CARE EDUCATION/TRAINING PROGRAM | Admitting: HOSPITALIST
Payer: MEDICARE

## 2021-05-08 VITALS
HEIGHT: 67 IN | SYSTOLIC BLOOD PRESSURE: 120 MMHG | TEMPERATURE: 97 F | WEIGHT: 160.06 LBS | RESPIRATION RATE: 20 BRPM | OXYGEN SATURATION: 99 % | HEART RATE: 97 BPM | DIASTOLIC BLOOD PRESSURE: 78 MMHG

## 2021-05-08 DIAGNOSIS — R19.7 DIARRHEA, UNSPECIFIED: ICD-10-CM

## 2021-05-08 DIAGNOSIS — Z98.89 OTHER SPECIFIED POSTPROCEDURAL STATES: Chronic | ICD-10-CM

## 2021-05-08 DIAGNOSIS — I10 ESSENTIAL (PRIMARY) HYPERTENSION: ICD-10-CM

## 2021-05-08 DIAGNOSIS — E83.42 HYPOMAGNESEMIA: ICD-10-CM

## 2021-05-08 DIAGNOSIS — Z96.653 PRESENCE OF ARTIFICIAL KNEE JOINT, BILATERAL: Chronic | ICD-10-CM

## 2021-05-08 DIAGNOSIS — E78.00 PURE HYPERCHOLESTEROLEMIA, UNSPECIFIED: ICD-10-CM

## 2021-05-08 DIAGNOSIS — Z90.5 ACQUIRED ABSENCE OF KIDNEY: Chronic | ICD-10-CM

## 2021-05-08 DIAGNOSIS — N17.9 ACUTE KIDNEY FAILURE, UNSPECIFIED: ICD-10-CM

## 2021-05-08 DIAGNOSIS — Z29.9 ENCOUNTER FOR PROPHYLACTIC MEASURES, UNSPECIFIED: ICD-10-CM

## 2021-05-08 DIAGNOSIS — E03.9 HYPOTHYROIDISM, UNSPECIFIED: ICD-10-CM

## 2021-05-08 LAB
ALBUMIN SERPL ELPH-MCNC: 3.8 G/DL — SIGNIFICANT CHANGE UP (ref 3.3–5)
ALP SERPL-CCNC: 71 U/L — SIGNIFICANT CHANGE UP (ref 40–120)
ALT FLD-CCNC: 24 U/L — SIGNIFICANT CHANGE UP (ref 10–45)
ANION GAP SERPL CALC-SCNC: 15 MMOL/L — SIGNIFICANT CHANGE UP (ref 5–17)
AST SERPL-CCNC: 75 U/L — HIGH (ref 10–40)
BASOPHILS # BLD AUTO: 0 K/UL — SIGNIFICANT CHANGE UP (ref 0–0.2)
BASOPHILS NFR BLD AUTO: 0 % — SIGNIFICANT CHANGE UP (ref 0–2)
BILIRUB SERPL-MCNC: 0.7 MG/DL — SIGNIFICANT CHANGE UP (ref 0.2–1.2)
BUN SERPL-MCNC: 30 MG/DL — HIGH (ref 7–23)
CALCIUM SERPL-MCNC: 10.1 MG/DL — SIGNIFICANT CHANGE UP (ref 8.4–10.5)
CHLORIDE SERPL-SCNC: 102 MMOL/L — SIGNIFICANT CHANGE UP (ref 96–108)
CO2 SERPL-SCNC: 20 MMOL/L — LOW (ref 22–31)
CREAT SERPL-MCNC: 1.57 MG/DL — HIGH (ref 0.5–1.3)
EOSINOPHIL # BLD AUTO: 0.09 K/UL — SIGNIFICANT CHANGE UP (ref 0–0.5)
EOSINOPHIL NFR BLD AUTO: 1 % — SIGNIFICANT CHANGE UP (ref 0–6)
GAS PNL BLDV: SIGNIFICANT CHANGE UP
GLUCOSE SERPL-MCNC: 112 MG/DL — HIGH (ref 70–99)
HCT VFR BLD CALC: 41.7 % — SIGNIFICANT CHANGE UP (ref 34.5–45)
HGB BLD-MCNC: 13.7 G/DL — SIGNIFICANT CHANGE UP (ref 11.5–15.5)
LACTATE SERPL-SCNC: 1.6 MMOL/L — SIGNIFICANT CHANGE UP (ref 0.7–2)
LYMPHOCYTES # BLD AUTO: 0.69 K/UL — LOW (ref 1–3.3)
LYMPHOCYTES # BLD AUTO: 8 % — LOW (ref 13–44)
MAGNESIUM SERPL-MCNC: 1.5 MG/DL — LOW (ref 1.6–2.6)
MCHC RBC-ENTMCNC: 31.9 PG — SIGNIFICANT CHANGE UP (ref 27–34)
MCHC RBC-ENTMCNC: 32.9 GM/DL — SIGNIFICANT CHANGE UP (ref 32–36)
MCV RBC AUTO: 97.2 FL — SIGNIFICANT CHANGE UP (ref 80–100)
MONOCYTES # BLD AUTO: 1.29 K/UL — HIGH (ref 0–0.9)
MONOCYTES NFR BLD AUTO: 15 % — HIGH (ref 2–14)
NEUTROPHILS # BLD AUTO: 6.19 K/UL — SIGNIFICANT CHANGE UP (ref 1.8–7.4)
NEUTROPHILS NFR BLD AUTO: 40 % — LOW (ref 43–77)
PHOSPHATE SERPL-MCNC: 2.5 MG/DL — SIGNIFICANT CHANGE UP (ref 2.5–4.5)
PLATELET # BLD AUTO: 317 K/UL — SIGNIFICANT CHANGE UP (ref 150–400)
POTASSIUM SERPL-MCNC: 5.2 MMOL/L — SIGNIFICANT CHANGE UP (ref 3.5–5.3)
POTASSIUM SERPL-SCNC: 5.2 MMOL/L — SIGNIFICANT CHANGE UP (ref 3.5–5.3)
PROT SERPL-MCNC: 7 G/DL — SIGNIFICANT CHANGE UP (ref 6–8.3)
RBC # BLD: 4.29 M/UL — SIGNIFICANT CHANGE UP (ref 3.8–5.2)
RBC # FLD: 14.2 % — SIGNIFICANT CHANGE UP (ref 10.3–14.5)
SODIUM SERPL-SCNC: 137 MMOL/L — SIGNIFICANT CHANGE UP (ref 135–145)
WBC # BLD: 8.6 K/UL — SIGNIFICANT CHANGE UP (ref 3.8–10.5)
WBC # FLD AUTO: 8.6 K/UL — SIGNIFICANT CHANGE UP (ref 3.8–10.5)

## 2021-05-08 PROCEDURE — 99223 1ST HOSP IP/OBS HIGH 75: CPT

## 2021-05-08 PROCEDURE — 74176 CT ABD & PELVIS W/O CONTRAST: CPT | Mod: 26,MA

## 2021-05-08 PROCEDURE — 99285 EMERGENCY DEPT VISIT HI MDM: CPT | Mod: CS

## 2021-05-08 RX ORDER — ATORVASTATIN CALCIUM 80 MG/1
40 TABLET, FILM COATED ORAL AT BEDTIME
Refills: 0 | Status: DISCONTINUED | OUTPATIENT
Start: 2021-05-08 | End: 2021-05-13

## 2021-05-08 RX ORDER — LEVOTHYROXINE SODIUM 125 MCG
137 TABLET ORAL DAILY
Refills: 0 | Status: DISCONTINUED | OUTPATIENT
Start: 2021-05-08 | End: 2021-05-13

## 2021-05-08 RX ORDER — MAGNESIUM SULFATE 500 MG/ML
1 VIAL (ML) INJECTION ONCE
Refills: 0 | Status: DISCONTINUED | OUTPATIENT
Start: 2021-05-08 | End: 2021-05-08

## 2021-05-08 RX ORDER — MAGNESIUM SULFATE 500 MG/ML
1 VIAL (ML) INJECTION ONCE
Refills: 0 | Status: COMPLETED | OUTPATIENT
Start: 2021-05-08 | End: 2021-05-08

## 2021-05-08 RX ORDER — ACETAMINOPHEN 500 MG
650 TABLET ORAL EVERY 4 HOURS
Refills: 0 | Status: DISCONTINUED | OUTPATIENT
Start: 2021-05-08 | End: 2021-05-13

## 2021-05-08 RX ORDER — LOSARTAN POTASSIUM 100 MG/1
100 TABLET, FILM COATED ORAL DAILY
Refills: 0 | Status: DISCONTINUED | OUTPATIENT
Start: 2021-05-08 | End: 2021-05-13

## 2021-05-08 RX ORDER — CHOLECALCIFEROL (VITAMIN D3) 125 MCG
1000 CAPSULE ORAL DAILY
Refills: 0 | Status: DISCONTINUED | OUTPATIENT
Start: 2021-05-08 | End: 2021-05-13

## 2021-05-08 RX ORDER — SODIUM CHLORIDE 9 MG/ML
1000 INJECTION, SOLUTION INTRAVENOUS ONCE
Refills: 0 | Status: COMPLETED | OUTPATIENT
Start: 2021-05-08 | End: 2021-05-08

## 2021-05-08 RX ORDER — HEPARIN SODIUM 5000 [USP'U]/ML
5000 INJECTION INTRAVENOUS; SUBCUTANEOUS
Refills: 0 | Status: DISCONTINUED | OUTPATIENT
Start: 2021-05-08 | End: 2021-05-13

## 2021-05-08 RX ORDER — SODIUM CHLORIDE 9 MG/ML
1000 INJECTION, SOLUTION INTRAVENOUS
Refills: 0 | Status: COMPLETED | OUTPATIENT
Start: 2021-05-08 | End: 2021-05-09

## 2021-05-08 RX ADMIN — Medication 100 GRAM(S): at 21:51

## 2021-05-08 RX ADMIN — SODIUM CHLORIDE 1000 MILLILITER(S): 9 INJECTION, SOLUTION INTRAVENOUS at 21:30

## 2021-05-08 NOTE — ED PROVIDER NOTE - ATTENDING CONTRIBUTION TO CARE
I performed a history and physical exam of the patient and discussed their management with the resident. I reviewed the resident's note and agree with the documented findings and plan of care. My medical decision making and observations are found above.    87 y/o F with HTN, hypothyroidism, hyperlipidemia, recent COVID infection p/w diarrhea. States she has 4-5 episodes of explosive diarrhea, starting 4-5 days ago a/w diffuse weakness. No fevers, vomiting, recent travel/abx. On exam, nontoxic appearing, nad.  cv rrr no m/r/g, lungs ctabl no resp distress. abd soft, ntnd. 2+ distal pulses. 5/5 distal strength. nonfocal neuro exam. likely c/w gastroenteritis vs colitis. not c/w cdiff/infectious etiology. given benign abd exam, will defer imaging, but low thresshold to scan pending workup.

## 2021-05-08 NOTE — H&P ADULT - PROBLEM SELECTOR PLAN 2
afebrile and normotensive , however labs w/ bandemia   - f/u CT abdomen pelvis   - check Cdiff   - check GI pcr and ova/ parasites   - maintain contact isolation until cdiff negative or diarrhea resolves  - monitor and replete electrolytes

## 2021-05-08 NOTE — PATIENT PROFILE ADULT - DO YOU FEEL UNSAFE AT HOME, WORK, OR SCHOOL?
GA @ birth: 39.5  HC(cm): 32.5 (09-15) | Length(cm):Height (cm): 50 (09-16-21 @ 00:19) | Saskia weight % _____ | ADWG (g/day): _____    Current/Last Weight in grams: 2930 (09-16), 2930 (09-16)       no

## 2021-05-08 NOTE — H&P ADULT - NSHPREVIEWOFSYSTEMS_GEN_ALL_CORE
CONSTITUTIONAL:+  weakness, no fevers or chills  EYES/ENT: No visual changes;  No dysphagia  NECK: No pain or stiffness  RESPIRATORY: No cough, wheezing, hemoptysis; No shortness of breath  CARDIOVASCULAR: No chest pain or palpitations; No lower extremity edema  EXTREMITIES: no le edema, cyanosis, clubbing  GASTROINTESTINAL: No abdominal or epigastric pain. No nausea, vomiting, or hematemesis; + diarrhea  no constipation. No melena or hematochezia.  BACK: No back pain  GENITOURINARY: No dysuria, frequency or hematuria  NEUROLOGICAL: No numbness or weakness  MSK: no joint swelling or pain  SKIN: No itching, burning, rashes, or lesions   PSYCH: no agitation  All other review of systems is negative unless indicated above.

## 2021-05-08 NOTE — H&P ADULT - ASSESSMENT
86F w/HTN, hypothyroidism, hyperlipidemia, recent COVID infection ( late Feb ’21) ,  p/w diarrhea x 5 days

## 2021-05-08 NOTE — H&P ADULT - NSHPSOCIALHISTORY_GEN_ALL_CORE
Social History:    Lives with: ( x ) alone  (  ) children   (  ) spouse   (  ) parents  (  ) other    Substance Use (street drugs): ( x ) never used  (  ) other:  Tobacco Usage:  ( x  ) non smoker   Alcohol Usage: no etoh use

## 2021-05-08 NOTE — H&P ADULT - NSICDXPASTSURGICALHX_GEN_ALL_CORE_FT
PAST SURGICAL HISTORY:  S/P  x 4    S/P knee replacement, bilateral     S/p nephrectomy left (  )    S/P tonsillectomy in childhood

## 2021-05-08 NOTE — H&P ADULT - NSHPPHYSICALEXAM_GEN_ALL_CORE
Vital Signs Last 24 Hrs  T(C): 36.8 (08 May 2021 20:14), Max: 36.8 (08 May 2021 20:14)  T(F): 98.2 (08 May 2021 20:14), Max: 98.2 (08 May 2021 20:14)  HR: 89 (08 May 2021 20:15) (89 - 97)  BP: 138/69 (08 May 2021 20:15) (120/78 - 154/91)  BP(mean): --  RR: 20 (08 May 2021 20:15) (20 - 20)  SpO2: 100% (08 May 2021 20:15) (99% - 100%) Vital Signs Last 24 Hrs  T(C): 36.8 (08 May 2021 20:14), Max: 36.8 (08 May 2021 20:14)  T(F): 98.2 (08 May 2021 20:14), Max: 98.2 (08 May 2021 20:14)  HR: 89 (08 May 2021 20:15) (89 - 97)  BP: 138/69 (08 May 2021 20:15) (120/78 - 154/91)  BP(mean): --  RR: 20 (08 May 2021 20:15) (20 - 20)  SpO2: 100% (08 May 2021 20:15) (99% - 100%)    GENERAL: No acute distress, well-developed  HEAD:  Atraumatic, Normocephalic  ENT: EOMI, PERRLA, conjunctiva and sclera clear,  moist mucosa no pharyngeal erythema or exudates   NECK: supple , no JVD   CHEST/LUNG: Clear to auscultation bilaterally; No wheeze, equal breath sounds bilaterally   BACK: No spinal tenderness,  No CVA tenderness   HEART: Regular rate and rhythm; No murmurs, rubs, or gallops  ABDOMEN: Soft, Nontender, Nondistended; Bowel sounds present  EXTREMITIES:  No clubbing, cyanosis, or edema  MSK: No joint swelling or effusions, ROM intact   PSYCH: Normal behavior/affect  NEUROLOGY: resting tremor on left had , AAOx3, non-focal, cranial nerves intact  SKIN: Normal color, No rashes or lesions

## 2021-05-08 NOTE — ED ADULT NURSE NOTE - OBJECTIVE STATEMENT
patient is 86 year old female with a PMH of HTN, HLD who presents to the ED from home complaining of diarrhea and weakness x 4 days. patient states she has been having multiple episodes of diarrhea and now is complaining of generalized weakness. patient states she lives alone but the past few days has had no ability to ambulate due to weakness so family has been helping her. patient denies blood in diarrhea at this time. patient is aaox3, lungs CTA bilaterally, abd soft, nondistended, nontender, cap refill <3, radial pulses +2 bilaterally. patient denies chest pain, shortness of breath, ha, dizziness, weakness, numbness or tingling, fever, chills, cough, n/v, abd pain, back pain, changes in bowel or bladder, hematuria, bloody stool. patient resting on stretcher. will continue to monitor. IV placed.

## 2021-05-08 NOTE — H&P ADULT - NSHPLABSRESULTS_GEN_ALL_CORE
Labs personally reviewed:                          13.7   8.60  )-----------( 317      ( 08 May 2021 20:49 )             41.7     05-08    137  |  102  |  30<H>  ----------------------------<  112<H>  5.2   |  20<L>  |  1.57<H>    Ca    10.1      08 May 2021 20:49  Phos  2.5     05-08  Mg     1.5     05-08    TPro  7.0  /  Alb  3.8  /  TBili  0.7  /  DBili  x   /  AST  75<H>  /  ALT  24  /  AlkPhos  71  05-08        LIVER FUNCTIONS - ( 08 May 2021 20:49 )  Alb: 3.8 g/dL / Pro: 7.0 g/dL / ALK PHOS: 71 U/L / ALT: 24 U/L / AST: 75 U/L / GGT: x               CAPILLARY BLOOD GLUCOSE          Imaging:  CT abdomen pelvis - ordered results pending     EKG personally reviewed: Labs personally reviewed:                          13.7   8.60  )-----------( 317      ( 08 May 2021 20:49 )             41.7     05-08    137  |  102  |  30<H>  ----------------------------<  112<H>  5.2   |  20<L>  |  1.57<H>    Ca    10.1      08 May 2021 20:49  Phos  2.5     05-08  Mg     1.5     05-08    TPro  7.0  /  Alb  3.8  /  TBili  0.7  /  DBili  x   /  AST  75<H>  /  ALT  24  /  AlkPhos  71  05-08        LIVER FUNCTIONS - ( 08 May 2021 20:49 )  Alb: 3.8 g/dL / Pro: 7.0 g/dL / ALK PHOS: 71 U/L / ALT: 24 U/L / AST: 75 U/L / GGT: x               CAPILLARY BLOOD GLUCOSE          Imaging:  CT abdomen pelvis - ordered results pending     cardiac tracing personally reviewed: sinus rhythm

## 2021-05-08 NOTE — H&P ADULT - NSICDXFAMILYHX_GEN_ALL_CORE_FT
FAMILY HISTORY:  Mother  Still living? No  Family history of lung cancer, Age at diagnosis: Age Unknown

## 2021-05-08 NOTE — ED PROVIDER NOTE - OBJECTIVE STATEMENT
87 y/o F with HTN, hypothyroidism, hyperlipidemia, recent COVID infection p/w diarrhea. States she has 4-5 episodes of explosive diarrhea, starting 4-5 days ago. She feels the urge to go to the bathroom, and at times she can't make it to the bathroom, and only minimal stool comes out. It is described as watery. She denies any other symptoms, including fevers, chest pain, SOB, abdominal pain, N/V, recent Abx use or med changes, travel, or changes in diet. No one has similar symptoms. 85 y/o F with HTN, hypothyroidism, hyperlipidemia, recent COVID infection p/w diarrhea. States she has 4-5 episodes of explosive diarrhea, starting 4-5 days ago. She feels the urge to go to the bathroom, and at times she can't make it to the bathroom, and only minimal stool comes out. It is described as watery. She denies any other symptoms, including fevers, chest pain, SOB, abdominal pain, N/V, recent Abx use or med changes, travel, or changes in diet. No one has similar symptoms.     Of note, patient has staples and cannot get an MRI

## 2021-05-08 NOTE — H&P ADULT - PROBLEM SELECTOR PLAN 1
suspect pre-renal in setting of diarrhea   - s/p 1 L LR   - continue IV hydration ,  LR at 125 cc/hr x 1L   - renal dose medications  - monitor ins and outs  - monitor creatinine   - avoid nephrotoxins

## 2021-05-08 NOTE — H&P ADULT - HISTORY OF PRESENT ILLNESS
85 y/o F with HTN, hypothyroidism, hyperlipidemia, recent COVID infection ( late Feb ’21) , presents for  diarrhea over the past five days. Patient reports 4-5 episodes of explosive watery  non-bloody diarrhea, associated with tenesmus  and urge incontinence.  She reports no recent travel , no abdominal pain , no fever or chills , no chest pain or SOB, no wheezing, no skin flushing .  No recent consumption of undercooked or spoiled food , no similar symptoms among family members.

## 2021-05-08 NOTE — ED PROVIDER NOTE - PROGRESS NOTE DETAILS
OLIVER and significant bandemia present on labs. Will obtain blood cx and lactate. Will need admission to medicine.

## 2021-05-08 NOTE — ED PROVIDER NOTE - CLINICAL SUMMARY MEDICAL DECISION MAKING FREE TEXT BOX
87 y/o F with HTN, HLD, hypothyroidism, and recent COVID infection p/w diarrhea. Etiologies include viral gastroenteritis vs. C. diff. Will check CBC, CMP, Mg, Ph and stool culture/ova/parasites.

## 2021-05-09 DIAGNOSIS — K63.89 OTHER SPECIFIED DISEASES OF INTESTINE: ICD-10-CM

## 2021-05-09 DIAGNOSIS — A41.9 SEPSIS, UNSPECIFIED ORGANISM: ICD-10-CM

## 2021-05-09 LAB
ALBUMIN SERPL ELPH-MCNC: 3.4 G/DL — SIGNIFICANT CHANGE UP (ref 3.3–5)
ALP SERPL-CCNC: 63 U/L — SIGNIFICANT CHANGE UP (ref 40–120)
ALT FLD-CCNC: 21 U/L — SIGNIFICANT CHANGE UP (ref 10–45)
ANION GAP SERPL CALC-SCNC: 13 MMOL/L — SIGNIFICANT CHANGE UP (ref 5–17)
ANISOCYTOSIS BLD QL: SLIGHT — SIGNIFICANT CHANGE UP
AST SERPL-CCNC: 56 U/L — HIGH (ref 10–40)
BASOPHILS # BLD AUTO: 0 K/UL — SIGNIFICANT CHANGE UP (ref 0–0.2)
BASOPHILS NFR BLD AUTO: 0 % — SIGNIFICANT CHANGE UP (ref 0–2)
BILIRUB SERPL-MCNC: 0.8 MG/DL — SIGNIFICANT CHANGE UP (ref 0.2–1.2)
BUN SERPL-MCNC: 22 MG/DL — SIGNIFICANT CHANGE UP (ref 7–23)
C DIFF GDH STL QL: NEGATIVE — SIGNIFICANT CHANGE UP
C DIFF GDH STL QL: SIGNIFICANT CHANGE UP
CALCIUM SERPL-MCNC: 9.5 MG/DL — SIGNIFICANT CHANGE UP (ref 8.4–10.5)
CHLORIDE SERPL-SCNC: 104 MMOL/L — SIGNIFICANT CHANGE UP (ref 96–108)
CO2 SERPL-SCNC: 22 MMOL/L — SIGNIFICANT CHANGE UP (ref 22–31)
CREAT SERPL-MCNC: 1.33 MG/DL — HIGH (ref 0.5–1.3)
CULTURE RESULTS: SIGNIFICANT CHANGE UP
ELLIPTOCYTES BLD QL SMEAR: SLIGHT — SIGNIFICANT CHANGE UP
EOSINOPHIL # BLD AUTO: 0.1 K/UL — SIGNIFICANT CHANGE UP (ref 0–0.5)
EOSINOPHIL NFR BLD AUTO: 1.8 % — SIGNIFICANT CHANGE UP (ref 0–6)
GIANT PLATELETS BLD QL SMEAR: PRESENT — SIGNIFICANT CHANGE UP
GLUCOSE SERPL-MCNC: 103 MG/DL — HIGH (ref 70–99)
HCT VFR BLD CALC: 37.3 % — SIGNIFICANT CHANGE UP (ref 34.5–45)
HGB BLD-MCNC: 12.5 G/DL — SIGNIFICANT CHANGE UP (ref 11.5–15.5)
LACTATE SERPL-SCNC: 1.4 MMOL/L — SIGNIFICANT CHANGE UP (ref 0.7–2)
LYMPHOCYTES # BLD AUTO: 0.89 K/UL — LOW (ref 1–3.3)
LYMPHOCYTES # BLD AUTO: 15.8 % — SIGNIFICANT CHANGE UP (ref 13–44)
MACROCYTES BLD QL: SLIGHT — SIGNIFICANT CHANGE UP
MAGNESIUM SERPL-MCNC: 1.6 MG/DL — SIGNIFICANT CHANGE UP (ref 1.6–2.6)
MANUAL SMEAR VERIFICATION: SIGNIFICANT CHANGE UP
MCHC RBC-ENTMCNC: 32.5 PG — SIGNIFICANT CHANGE UP (ref 27–34)
MCHC RBC-ENTMCNC: 33.5 GM/DL — SIGNIFICANT CHANGE UP (ref 32–36)
MCV RBC AUTO: 96.9 FL — SIGNIFICANT CHANGE UP (ref 80–100)
MONOCYTES # BLD AUTO: 0.99 K/UL — HIGH (ref 0–0.9)
MONOCYTES NFR BLD AUTO: 17.5 % — HIGH (ref 2–14)
NEUTROPHILS # BLD AUTO: 3.62 K/UL — SIGNIFICANT CHANGE UP (ref 1.8–7.4)
NEUTROPHILS NFR BLD AUTO: 46.5 % — SIGNIFICANT CHANGE UP (ref 43–77)
NEUTS BAND # BLD: 17.5 % — HIGH (ref 0–8)
PHOSPHATE SERPL-MCNC: 2.1 MG/DL — LOW (ref 2.5–4.5)
PLAT MORPH BLD: ABNORMAL
PLATELET # BLD AUTO: 274 K/UL — SIGNIFICANT CHANGE UP (ref 150–400)
POIKILOCYTOSIS BLD QL AUTO: SLIGHT — SIGNIFICANT CHANGE UP
POLYCHROMASIA BLD QL SMEAR: SLIGHT — SIGNIFICANT CHANGE UP
POTASSIUM SERPL-MCNC: 3.9 MMOL/L — SIGNIFICANT CHANGE UP (ref 3.5–5.3)
POTASSIUM SERPL-SCNC: 3.9 MMOL/L — SIGNIFICANT CHANGE UP (ref 3.5–5.3)
PROT SERPL-MCNC: 6 G/DL — SIGNIFICANT CHANGE UP (ref 6–8.3)
RBC # BLD: 3.85 M/UL — SIGNIFICANT CHANGE UP (ref 3.8–5.2)
RBC # FLD: 13.9 % — SIGNIFICANT CHANGE UP (ref 10.3–14.5)
RBC BLD AUTO: ABNORMAL
SARS-COV-2 RNA SPEC QL NAA+PROBE: SIGNIFICANT CHANGE UP
SODIUM SERPL-SCNC: 139 MMOL/L — SIGNIFICANT CHANGE UP (ref 135–145)
SPECIMEN SOURCE: SIGNIFICANT CHANGE UP
TARGETS BLD QL SMEAR: SLIGHT — SIGNIFICANT CHANGE UP
TSH SERPL-MCNC: 3.16 UIU/ML — SIGNIFICANT CHANGE UP (ref 0.27–4.2)
VARIANT LYMPHS # BLD: 0.9 % — SIGNIFICANT CHANGE UP (ref 0–6)
WBC # BLD: 5.65 K/UL — SIGNIFICANT CHANGE UP (ref 3.8–10.5)
WBC # FLD AUTO: 5.65 K/UL — SIGNIFICANT CHANGE UP (ref 3.8–10.5)

## 2021-05-09 PROCEDURE — 99233 SBSQ HOSP IP/OBS HIGH 50: CPT

## 2021-05-09 RX ORDER — METRONIDAZOLE 500 MG
500 TABLET ORAL EVERY 8 HOURS
Refills: 0 | Status: DISCONTINUED | OUTPATIENT
Start: 2021-05-09 | End: 2021-05-13

## 2021-05-09 RX ORDER — CIPROFLOXACIN LACTATE 400MG/40ML
500 VIAL (ML) INTRAVENOUS EVERY 24 HOURS
Refills: 0 | Status: DISCONTINUED | OUTPATIENT
Start: 2021-05-09 | End: 2021-05-13

## 2021-05-09 RX ORDER — CIPROFLOXACIN LACTATE 400MG/40ML
250 VIAL (ML) INTRAVENOUS EVERY 24 HOURS
Refills: 0 | Status: DISCONTINUED | OUTPATIENT
Start: 2021-05-09 | End: 2021-05-09

## 2021-05-09 RX ORDER — SODIUM,POTASSIUM PHOSPHATES 278-250MG
1 POWDER IN PACKET (EA) ORAL THREE TIMES A DAY
Refills: 0 | Status: COMPLETED | OUTPATIENT
Start: 2021-05-09 | End: 2021-05-10

## 2021-05-09 RX ORDER — MAGNESIUM SULFATE 500 MG/ML
1 VIAL (ML) INJECTION ONCE
Refills: 0 | Status: COMPLETED | OUTPATIENT
Start: 2021-05-09 | End: 2021-05-09

## 2021-05-09 RX ADMIN — Medication 500 MILLIGRAM(S): at 05:21

## 2021-05-09 RX ADMIN — Medication 1 PACKET(S): at 22:54

## 2021-05-09 RX ADMIN — Medication 100 GRAM(S): at 14:00

## 2021-05-09 RX ADMIN — Medication 1 PACKET(S): at 17:42

## 2021-05-09 RX ADMIN — Medication 500 MILLIGRAM(S): at 13:59

## 2021-05-09 RX ADMIN — HEPARIN SODIUM 5000 UNIT(S): 5000 INJECTION INTRAVENOUS; SUBCUTANEOUS at 00:29

## 2021-05-09 RX ADMIN — ATORVASTATIN CALCIUM 40 MILLIGRAM(S): 80 TABLET, FILM COATED ORAL at 00:29

## 2021-05-09 RX ADMIN — LOSARTAN POTASSIUM 100 MILLIGRAM(S): 100 TABLET, FILM COATED ORAL at 05:22

## 2021-05-09 RX ADMIN — Medication 1 TABLET(S): at 11:23

## 2021-05-09 RX ADMIN — ATORVASTATIN CALCIUM 40 MILLIGRAM(S): 80 TABLET, FILM COATED ORAL at 21:37

## 2021-05-09 RX ADMIN — HEPARIN SODIUM 5000 UNIT(S): 5000 INJECTION INTRAVENOUS; SUBCUTANEOUS at 18:37

## 2021-05-09 RX ADMIN — Medication 500 MILLIGRAM(S): at 07:01

## 2021-05-09 RX ADMIN — Medication 500 MILLIGRAM(S): at 21:36

## 2021-05-09 RX ADMIN — SODIUM CHLORIDE 125 MILLILITER(S): 9 INJECTION, SOLUTION INTRAVENOUS at 00:29

## 2021-05-09 RX ADMIN — Medication 137 MICROGRAM(S): at 05:21

## 2021-05-09 RX ADMIN — HEPARIN SODIUM 5000 UNIT(S): 5000 INJECTION INTRAVENOUS; SUBCUTANEOUS at 05:22

## 2021-05-09 RX ADMIN — Medication 1000 UNIT(S): at 11:22

## 2021-05-09 NOTE — PROVIDER CONTACT NOTE (OTHER) - NAME OF MD/NP/PA/DO NOTIFIED:
12/31/20        Boogie Villa  [address] [facility] [address]        Dear Boogie,    It was a pleasure to see you recently for consideration of kidney transplantation. Your pre-transplant evaluation results were reviewed at our Multidisciplinary Selection Committee 12/30/2020. The Committee is requesting the following items are completed before determining your candidacy:    1. Needs to lose 10 pounds or engage in gastric sleeve surgery pathway in order to proceed with next step of scheduling the remainder of evaluation appointments.     For any questions, please contact myself at  the Transplant Office Main Number  at [phone] or at my Direct Number at [phone].      Sincerely,      Alexandra Grijalva RN BSN Transplant Coordinator   Solid Organ Transplant  [facility]    CC's: Dr. Raciel Morton, Dr. Michael Diaz    Julia DAVIES

## 2021-05-09 NOTE — CONSULT NOTE ADULT - ASSESSMENT
86 F w acute diarrhea    1. Acute diarrhea: CT suggestive of colitis. Most likely infectious etiology, less likely IBD. Doubt ischemia given lack of blood.  -short course of abx  -advance diet as tolerated  -colonoscopy in 4-6 weeks given no prior colonoscopy, risk of malignancy explained.    2. Bandemia: improved    3. HTN       Advanced care planning forms were discussed. Code status including forceful chest compressions, defibrillation and intubation were discussed. The risks benefits and alternatives to pertinent gastrointestinal procedures and interventions were discussed in detail and all questions were answered. Duration: 15 Minutes.      Amari Gutierres M.D.   Gastroenterology and Hepatology  [address] [place], [place]  Office: [phone]  Cell: [phone]

## 2021-05-09 NOTE — PROGRESS NOTE ADULT - PROBLEM SELECTOR PLAN 1
suspect pre-renal in setting of diarrhea   - s/p 1 L LR   - continue IV hydration ,  LR at 125 cc/hr x 1L   - renal dose medications  - monitor ins and outs  - monitor creatinine   - avoid nephrotoxins CT with Sigmoid and rectal bowel wall thickening compatible with proctosigmoiditis.  -nonbloody diarrhea improving  -GI PCR negative  -cdif negative  -lactate negative  -stool count  -GI consult pending  -antibiotics as below

## 2021-05-09 NOTE — PROGRESS NOTE ADULT - PROBLEM SELECTOR PLAN 3
- repleted   - monitor serum mag pre-renal in setting of diarrhea   - creatinine downtrending  - encourage po hydration  - renal dose medications  - monitor bmp

## 2021-05-09 NOTE — CONSULT NOTE ADULT - SUBJECTIVE AND OBJECTIVE BOX
Chief Complaint:  Patient is a 86y old  Female who presents with a chief complaint of diarrhea x few days (09 May 2021 12:53)      Date of service: 21 @ 18:34    HPI:    The patient is a 85 y/o F with HTN, hypothyroidism, hyperlipidemia, recent COVID infection ( late ) , presents for  diarrhea over the past five days. Patient reports 4-5 episodes of explosive watery  non-bloody diarrhea, associated with tenesmus  and urge incontinence.  She reports no recent travel , no abdominal pain . She has never had a colonoscopy. She had similar symptoms a month ago after receiving the COVID vaccine. The symptoms seem to be improving today    Allergies:  penicillin (Pruritus; Rash)  sulfa drugs (Vomiting)      Home Medications:    Hospital Medications:  acetaminophen   Tablet .. 650 milliGRAM(s) Oral every 4 hours PRN  atorvastatin 40 milliGRAM(s) Oral at bedtime  cholecalciferol 1000 Unit(s) Oral daily  ciprofloxacin     Tablet 500 milliGRAM(s) Oral every 24 hours  heparin   Injectable 5000 Unit(s) SubCutaneous two times a day  levothyroxine 137 MICROGram(s) Oral daily  losartan 100 milliGRAM(s) Oral daily  metroNIDAZOLE    Tablet 500 milliGRAM(s) Oral every 8 hours  multivitamin 1 Tablet(s) Oral daily  potassium phosphate / sodium phosphate Powder (PHOS-NaK) 1 Packet(s) Oral three times a day      PMHX/PSHX:  Hypertension    Thyroid disease    High cholesterol    S/p nephrectomy    S/P     S/P tonsillectomy    S/P knee replacement, bilateral        Family history:  Family history of lung cancer (Mother)        Social History:   Denies ethanol use.  Denies illicit drug use.    ROS:     General:  No wt loss, fevers, chills, night sweats, fatigue,   Eyes:  Good vision, no reported pain  ENT:  No sore throat, pain, runny nose, dysphagia  CV:  No pain, palpitations, hypo/hypertension  Resp:  No dyspnea, cough, tachypnea, wheezing  GI:  See HPI  :  No pain, bleeding, incontinence, nocturia  Muscle:  No pain, weakness  Neuro:  No weakness, tingling, memory problems  Psych:  No fatigue, insomnia, mood problems, depression  Endocrine:  No polyuria, polydipsia, cold/heat intolerance  Heme:  No petechiae, ecchymosis, easy bruisability  Integumentary:  No rash, edema      PHYSICAL EXAM:     GENERAL:  Appears stated age, well-groomed, well-nourished, no distress  HEENT:  NC/AT,  conjunctivae anicteric, clear and pink,   NECK: supple, trachea midline  CHEST:  Full & symmetric excursion, no increased effort, breath sounds clear  HEART:  Regular rhythm, no JVD  ABDOMEN:  Soft, non-tender, non-distended, normoactive bowel sounds,  no masses , no hepatosplenomegaly  EXTREMITIES:  no cyanosis,clubbing or edema  SKIN:  No rash, erythema, or, ecchymoses, no jaundice  NEURO:  Alert, non-focal, no asterixis  PSYCH: Appropriate affect, oriented to place and time  RECTAL: Deferred      Vital Signs:  Vital Signs Last 24 Hrs  T(C): 37 (09 May 2021 13:01), Max: 37 (09 May 2021 13:01)  T(F): 98.6 (09 May 2021 13:01), Max: 98.6 (09 May 2021 13:01)  HR: 81 (09 May 2021 13:01) (81 - 97)  BP: 145/82 (09 May 2021 13:01) (135/69 - 154/91)  BP(mean): --  RR: 18 (09 May 2021 13:01) (18 - 20)  SpO2: 98% (09 May 2021 13:01) (96% - 100%)  Daily Height in cm: 170.18 (09 May 2021 13:01)    Daily     LABS: Labs personally reviewed by me:                        12.5   5.65  )-----------( 274      ( 09 May 2021 06:37 )             37.3         139  |  104  |  22  ----------------------------<  103<H>  3.9   |  22  |  1.33<H>    Ca    9.5      09 May 2021 06:37  Phos  2.1       Mg     1.6         TPro  6.0  /  Alb  3.4  /  TBili  0.8  /  DBili  x   /  AST  56<H>  /  ALT  21  /  AlkPhos  63      LIVER FUNCTIONS - ( 09 May 2021 06:37 )  Alb: 3.4 g/dL / Pro: 6.0 g/dL / ALK PHOS: 63 U/L / ALT: 21 U/L / AST: 56 U/L / GGT: x                   Imaging personally reviewed by me:

## 2021-05-09 NOTE — PROGRESS NOTE ADULT - PROBLEM SELECTOR PLAN 2
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

## 2021-05-09 NOTE — CHART NOTE - NSCHARTNOTEFT_GEN_A_CORE
d/w attending regarding CT findings of proctosigmoiditis, per discussion will begin cipro / flagyl, PO per attending instruction. Awaiting stool cultures, will continue to monitor.    Sylvester Flores PA-C  Department of Medicine

## 2021-05-10 LAB
ANION GAP SERPL CALC-SCNC: 14 MMOL/L — SIGNIFICANT CHANGE UP (ref 5–17)
BUN SERPL-MCNC: 15 MG/DL — SIGNIFICANT CHANGE UP (ref 7–23)
CALCIUM SERPL-MCNC: 9.4 MG/DL — SIGNIFICANT CHANGE UP (ref 8.4–10.5)
CHLORIDE SERPL-SCNC: 102 MMOL/L — SIGNIFICANT CHANGE UP (ref 96–108)
CO2 SERPL-SCNC: 22 MMOL/L — SIGNIFICANT CHANGE UP (ref 22–31)
COVID-19 SPIKE DOMAIN AB INTERP: POSITIVE
COVID-19 SPIKE DOMAIN ANTIBODY RESULT: >250 U/ML — HIGH
CREAT SERPL-MCNC: 1.22 MG/DL — SIGNIFICANT CHANGE UP (ref 0.5–1.3)
GLUCOSE SERPL-MCNC: 90 MG/DL — SIGNIFICANT CHANGE UP (ref 70–99)
HCT VFR BLD CALC: 36.9 % — SIGNIFICANT CHANGE UP (ref 34.5–45)
HGB BLD-MCNC: 12.4 G/DL — SIGNIFICANT CHANGE UP (ref 11.5–15.5)
MCHC RBC-ENTMCNC: 32.2 PG — SIGNIFICANT CHANGE UP (ref 27–34)
MCHC RBC-ENTMCNC: 33.6 GM/DL — SIGNIFICANT CHANGE UP (ref 32–36)
MCV RBC AUTO: 95.8 FL — SIGNIFICANT CHANGE UP (ref 80–100)
NRBC # BLD: 0 /100 WBCS — SIGNIFICANT CHANGE UP (ref 0–0)
PLATELET # BLD AUTO: 279 K/UL — SIGNIFICANT CHANGE UP (ref 150–400)
POTASSIUM SERPL-MCNC: 4 MMOL/L — SIGNIFICANT CHANGE UP (ref 3.5–5.3)
POTASSIUM SERPL-SCNC: 4 MMOL/L — SIGNIFICANT CHANGE UP (ref 3.5–5.3)
RBC # BLD: 3.85 M/UL — SIGNIFICANT CHANGE UP (ref 3.8–5.2)
RBC # FLD: 13.9 % — SIGNIFICANT CHANGE UP (ref 10.3–14.5)
SARS-COV-2 IGG+IGM SERPL QL IA: >250 U/ML — HIGH
SARS-COV-2 IGG+IGM SERPL QL IA: POSITIVE
SODIUM SERPL-SCNC: 138 MMOL/L — SIGNIFICANT CHANGE UP (ref 135–145)
WBC # BLD: 5.78 K/UL — SIGNIFICANT CHANGE UP (ref 3.8–10.5)
WBC # FLD AUTO: 5.78 K/UL — SIGNIFICANT CHANGE UP (ref 3.8–10.5)

## 2021-05-10 PROCEDURE — 99233 SBSQ HOSP IP/OBS HIGH 50: CPT

## 2021-05-10 RX ADMIN — Medication 500 MILLIGRAM(S): at 05:56

## 2021-05-10 RX ADMIN — Medication 500 MILLIGRAM(S): at 21:25

## 2021-05-10 RX ADMIN — Medication 1 TABLET(S): at 13:23

## 2021-05-10 RX ADMIN — ATORVASTATIN CALCIUM 40 MILLIGRAM(S): 80 TABLET, FILM COATED ORAL at 21:28

## 2021-05-10 RX ADMIN — HEPARIN SODIUM 5000 UNIT(S): 5000 INJECTION INTRAVENOUS; SUBCUTANEOUS at 17:23

## 2021-05-10 RX ADMIN — Medication 1000 UNIT(S): at 13:24

## 2021-05-10 RX ADMIN — Medication 137 MICROGRAM(S): at 05:56

## 2021-05-10 RX ADMIN — Medication 1 PACKET(S): at 05:57

## 2021-05-10 RX ADMIN — Medication 500 MILLIGRAM(S): at 13:24

## 2021-05-10 RX ADMIN — LOSARTAN POTASSIUM 100 MILLIGRAM(S): 100 TABLET, FILM COATED ORAL at 05:56

## 2021-05-10 RX ADMIN — HEPARIN SODIUM 5000 UNIT(S): 5000 INJECTION INTRAVENOUS; SUBCUTANEOUS at 05:55

## 2021-05-10 NOTE — PROGRESS NOTE ADULT - PROBLEM SELECTOR PLAN 1
CT with Sigmoid and rectal bowel wall thickening compatible with proctosigmoiditis.  -nonbloody diarrhea improving  -GI PCR negative  -cdif negative  -lactate negative  -stool count  -GI consult rec outpt scope in 6 weeks but if doesn't improve would consider inpt colonoscopy  -antibiotics as below

## 2021-05-10 NOTE — PROGRESS NOTE ADULT - PROBLEM SELECTOR PLAN 2
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

## 2021-05-10 NOTE — PHYSICAL THERAPY INITIAL EVALUATION ADULT - NS ASR RISK AREAS PT EVAL
shuffling steps during turns which is a fall risk. (per pt. h/o fall at home- slipped from couch)./fall

## 2021-05-10 NOTE — PROGRESS NOTE ADULT - PROBLEM SELECTOR PLAN 3
pre-renal in setting of diarrhea   - creatinine downtrending  - encourage po hydration  - renal dose medications  - monitor bmp

## 2021-05-10 NOTE — PHYSICAL THERAPY INITIAL EVALUATION ADULT - DISCHARGE DISPOSITION, PT EVAL
Sub acute rehab. However patient prefers d/c home. If patient d/c home,would recommend home with Home PT and assist for all functional mobility./rehabilitation facility

## 2021-05-10 NOTE — PHYSICAL THERAPY INITIAL EVALUATION ADULT - GENERAL OBSERVATIONS, REHAB EVAL
Patient : Joyce Smallwood Age: 44 year old Sex: female   MRN: [id number] Encounter Date: 4/22/2020    P05/P5   History     Chief Complaint   Patient presents with   • Shortness of Breath     HPI     4/22/2020  4:04 PM Joyce Smallwood is a 44 year old female who presents to the ED for the evaluation of shortness of breath that began 2 days ago.  The patient complains of associated cough, rhinorrhea, and wheezing.  The wheezing and shortness of breath her when the patient goes outside.  The cough is persistent and occurs mostly at night.  She denies any associated fever, chills, rhinorrhea, myalgias, nausea, vomiting, diarrhea, rash, or lower extremity pain or edema.  She denies taking birth control pills or estrogen containing medications.  She has no history of venous thromboembolism.  Additionally patient has been taking her albuterol more frequently in the last 2 days.  She has used her inhaler 3 times today without significant relief.  She is also using her Advair inhaler on a regular basis and has been compliant with this medication.  She denies any history of hospitalizations for asthma nor history of prednisone use for asthma exacerbation within the last month.          There are no further complaints or modifying factors at this time.    PCP: Terry Lowe MD     Allergies   Allergen Reactions   • Codeine HIVES and SWELLING   • Acetaminophen Other (See Comments)     Patient does not remember the reaction   • Tramadol Other (See Comments)     Sleep       Current Discharge Medication List      Prior to Admission Medications    Details   SUMAtriptan (IMITREX) 50 MG tablet Take 1 tablet by mouth at onset of migraine. May repeat after 2 hours if needed.  Qty: 30 tablet, Refills: 1      metoCLOPramide (REGLAN) 5 MG tablet Take 2 tablets by mouth 3 times daily as needed for Nausea or Vomiting.  Qty: 30 tablet, Refills: 0      benzonatate (TESSALON PERLES) 100 MG capsule Take 1 capsule by mouth 3 times daily as  needed for Cough.  Qty: 30 capsule, Refills: 0      naproxen (NAPROSYN) 500 MG tablet Take 1 tablet by mouth 2 times daily with meals.  Qty: 30 tablet, Refills: 0      tiZANidine (ZANAFLEX) 2 MG tablet Take 1 tablet by mouth every 6 hours as needed for Muscle spasms.  Qty: 30 tablet, Refills: 0      ibuprofen (MOTRIN) 800 MG tablet Take 1 tablet by mouth 3 times daily as needed for Pain.  Qty: 30 tablet, Refills: 0      cyclobenzaprine (FLEXERIL) 10 MG tablet Take 1 tablet by mouth 3 times daily as needed for Muscle spasms.  Qty: 15 tablet, Refills: 0      lidocaine (LIDODERM) 5 % patch Place 1 patch onto the skin every 24 hours. Remove patch 12 hours after applying  Qty: 30 patch, Refills: 0      clindamycin (CLEOCIN T) 1 % topical solution Refills: 6      clotrimazole (LOTRIMIN) 1 % cream Apply topically 2 times daily.  Qty: 30 g, Refills: 0      Phentermine-Topiramate 7.5-46 MG CAPSULE SR 24 HR Take 1 capsule by mouth daily.  Qty: 30 capsule, Refills: 0      hydroCORTisone (CORTIZONE) 2.5 % cream Apply 1 application topically 3 times daily. UNTIL REDNESS DISAPPEARS  Qty: 30 g, Refills: 0      albuterol 108 (90 Base) MCG/ACT inhaler Inhale 2 puffs into the lungs every 4 hours as needed for Wheezing.  Qty: 8.5 g, Refills: 0      amLODIPine (NORVASC) 5 MG tablet Take 1 tablet by mouth daily.  Qty: 30 tablet, Refills: 4      fluticasone-salmeterol (ADVAIR DISKUS) 500-50 MCG/DOSE AEROSOL POWDER, BREATH ACTIVATED Inhale 1 puff into the lungs two times daily.  Qty: 60 each, Refills: 11             Past Medical History:   Diagnosis Date   • Asthma    • BV (bacterial vaginosis)    • Chronic LBP    • Depression    • Dyspareunia, female 4/29/2014   • Hyperlipidemia    • Menometrorrhagia    • Migraine headache    • Narcolepsy    • Obesity    • Rotator cuff tendinitis     Right   • Sleep apnea    • STD (sexually transmitted disease)        Past Surgical History:   Procedure Laterality Date   • HYSTERECTOMY     • PAST SURGICAL  HISTORY      None   • TUBAL LIGATION         Family History   Problem Relation Age of Onset   • Allergic Rhinitis Daughter    • Allergic Rhinitis Son    • Allergic Rhinitis Son    • Asthma Sister    • NEGATIVE FAMILY HX OF Other    • Diabetes Maternal Aunt    • Stroke Maternal Aunt    • Hypertension Maternal Grandmother    • Migraine Maternal Grandmother    • Ophthalmology Maternal Grandmother         Glaucoma, blindness       Social History     Tobacco Use   • Smoking status: Never Smoker   • Smokeless tobacco: Never Used   Substance Use Topics   • Alcohol use: No     Alcohol/week: 0.0 standard drinks   • Drug use: No       Review of Systems   Constitutional: Negative for chills, diaphoresis, fever and unexpected weight change.   HENT: Positive for rhinorrhea. Negative for sore throat.    Eyes: Negative for pain and visual disturbance.   Respiratory: Positive for shortness of breath and wheezing. Negative for cough.    Cardiovascular: Negative for chest pain and leg swelling.   Gastrointestinal: Negative for abdominal pain, blood in stool, diarrhea, nausea and vomiting.   Genitourinary: Negative for dysuria and frequency.   Musculoskeletal: Negative for back pain and neck stiffness.   Skin: Negative for rash.   Neurological: Negative for dizziness, syncope, weakness, numbness and headaches.   Hematological: Negative for adenopathy. Does not bruise/bleed easily.   Psychiatric/Behavioral: Negative for dysphoric mood and suicidal ideas.       Physical Exam     ED Triage Vitals [04/22/20 1552]   ED Triage Vitals Group      Temp 98.3 °F (36.8 °C)      Heart Rate 88      Resp 18      /63      SpO2 100 %      EtCO2 mmHg       Height 5' 2\" (1.575 m)      Weight 180 lb (81.6 kg)      Weight Scale Used       BMI (Calculated) 32.92      IBW/kg (Calculated) 50.1       Physical Exam   Constitutional: She is oriented to person, place, and time. She appears well-developed and well-nourished. No distress.   HENT:    Mouth/Throat: Oropharynx is clear and moist. No oropharyngeal exudate.   Eyes: Pupils are equal, round, and reactive to light. Conjunctivae and EOM are normal. No scleral icterus.   Neck: Neck supple. No JVD present. No tracheal deviation present. No thyromegaly present.   Cardiovascular: Normal rate, regular rhythm, normal heart sounds and intact distal pulses.   No murmur heard.  Pulmonary/Chest: Effort normal. No stridor. No respiratory distress. She has wheezes (faint mid lung fields. good air movement). She has no rales.   Abdominal: Soft. Bowel sounds are normal. She exhibits no distension and no mass. There is no abdominal tenderness.   Musculoskeletal:         General: No tenderness or edema.   Lymphadenopathy:     She has no cervical adenopathy.   Neurological: She is alert and oriented to person, place, and time. She has normal strength. No sensory deficit.   Skin: Skin is dry. No rash noted. She is not diaphoretic. No erythema.   Psychiatric: She has a normal mood and affect.   Nursing note and vitals reviewed.      ED Course     Procedures    Lab Results     Results for orders placed or performed during the hospital encounter of 04/22/20   TROPONIN I  POINT OF CARE   Result Value Ref Range    TROPONIN I - POINT OF CARE <0.10 <0.10 ng/mL       EKG Results     EKG Interpretation  Rate: 75  Rhythm: Normal Sinus Rhythm  Left Axis Deviation  When compared with ECG of 24-Mar-2020  No significant change was found    EKG tracing interpreted by ED physician    Radiology Results     Imaging Results          XR Chest AP or PA (Final result)  Result time 04/22/20 17:12:52    Final result                 Impression:    IMPRESSION: No major acute cardiopulmonary disease detected.               Narrative:    PROCEDURE: XR CHEST AP OR PA    CLINICAL INDICATION: shortness of breath    COMPARISON: 12/10/2019.    FINDINGS: The cardiac silhouette is normal in size.   The lungs show no focal infiltrates, pleural effusions  or significant  pulmonary congestion.    Bronchovascular markings are normal.   No pulmonary masses.                                ED Medication Orders (From admission, onward)    Ordered Start     Status Ordering Provider    04/22/20 1644 04/22/20 1641    PRN      Discontinued JUANITO TARYN JENNIFER    04/22/20 1644 04/22/20 2100    2 times per day      Discontinued JUANITO TARYN JENNIFER    04/22/20 1644 04/22/20 1645  albuterol inhaler 6 puff  (albuterol (VENTOLIN) inhaler)  ONCE      Last MAR action:  Given JUANITOTARYN PATIÑO JENNIFER               [facility]    Vitals  Vitals:    04/22/20 1552 04/22/20 1630 04/22/20 1700 04/22/20 1800   BP: 106/63 (!) 154/88 (!) 139/92 (!) 143/76   Pulse: 88 76 82 80   Resp: 18 12 (!) 23    Temp: 98.3 °F (36.8 °C)      TempSrc: Oral      SpO2: 100% 100% 100% 98%   Weight: 81.6 kg      Height: 5' 2\" (1.575 m)          ED Course  5:55 pm I discussed lab test and chest x-ray findings with the patient.  Patient reports feeling improved after albuterol treatments.  She understands to return to the emergency department should she feel worse particularly for any shortness breath, otherwise she will follow up on outpatient basis with the primary physician.    MDM  HEART Score for Major Cardiac Events  History: Slightly suspicious   EKG Normal   AGE Less than or equal to 45   RISK FACTOR Equal or greater  than 3 risk factors or history of atherosclerotic disease   TROPONIN: Equal or less than normal limit   TOTAL SCORE 2       The following eight factors constitute the PE rule-out criteria (PERC):  Age less than 50 years   Heart rate less than 100 bpm   Oxyhemoglobin saturation ?95 percent   No hemoptysis   No estrogen use   No prior DVT or PE   No unilateral leg swelling   No surgery or trauma requiring hospitalization within the past four weeks    This patient meets all of these criteria, therefore I believe her risk for PE is very low and the patient does not require further  imaging.    Critical Care time spent on this patient outside of billable procedures:  None      Clinical Impression  The primary encounter diagnosis was Mild intermittent asthma with acute exacerbation. A diagnosis of Suspected 2019 novel coronavirus infection was also pertinent to this visit.    Follow Up:  Terry Lowe MD  [address] [address]  [phone]    In 2 days      Terry Lowe MD  [address] [address]  [phone]             Discharge Medication List as of 4/22/2020  6:14 PM        The pt was provided with a recommendation to follow up with their PCP for reassessment of his/her blood pressure within the next three months.    Pt is discharged in stable condition    ____________________________________________________________________    Dr. Norberto Kwon  Dictation # [id number]       Norberto Kwon MD  04/25/20 4780     received supine in bed in NAD.

## 2021-05-10 NOTE — PHYSICAL THERAPY INITIAL EVALUATION ADULT - ADDITIONAL COMMENTS
Patient lives alone in pvt house, 3 steps to enter. tenant down stairs. children live nearby.  Patient ambulated with rolling walker independent. Pt. owns rw, w/c at home.

## 2021-05-10 NOTE — PHYSICAL THERAPY INITIAL EVALUATION ADULT - PERTINENT HX OF CURRENT PROBLEM, REHAB EVAL
87 y/o F with HTN, hypothyroidism, hyperlipidemia, recent COVID infection ( late Feb ’21) , presents for  diarrhea over the past five days. Patient reports 4-5 episodes of explosive watery  non-bloody diarrhea, associated with tenesmus  and urge incontinence.

## 2021-05-10 NOTE — PROGRESS NOTE ADULT - PROBLEM SELECTOR PROBLEM 2
Pt intubated with 7.5 ETT secured at 23cm at [address] Positive color change and bilateral BS noted. Placed on vent at A/C 14/450/5/50%         04/16/19 1549   Vent Information   Ventilator Started Yes   Vent Type 840   Vent Mode AC/VC   Rate Set 14 bmp   Peak Flow 60 L/min   FiO2  50 %   Sensitivity 3   PEEP/CPAP 5   Cuff Pressure (cm H2O) 37 cm H2O   Vent Patient Data   Peak Inspiratory Pressure 18 cmH2O   Mean Airway Pressure 8 cmH20   Rate Measured 14 br/min   Vt Exhaled 453 mL   Minute Volume 6.4 Liters   I:E Ratio 1:4   Spontaneous Breathing Trial (SBT) RT Doc   Pulse 97   SpO2 100 %   Breath Sounds   Right Upper Lobe Clear   Right Middle Lobe Clear   Right Lower Lobe Clear   Left Upper Lobe Clear   Left Lower Lobe Clear   Additional Respiratory  Assessments   Resp 10   Position Supine   Alarm Settings   High Pressure Alarm 50 cmH2O   Delay Alarm 20 sec(s)   Low Minute Volume Alarm 4 L/min   Apnea (secs) 45 secs   Low Exhaled Vt  300 mL   ETT (adult)   Placement Date/Time: 04/16/19 3999   Timeout: Patient;Procedure;Site/Side;Appropriate Equipment  Preoxygenation: Yes  Mask Ventilation: Ventilated by mask (1)  Technique: Rapid sequence  Type: Cuffed  Tube Size: 8 mm  Laryngoscope: GlideScope  Locatio. ..    Secured at 23 cm   Measured From Teeth   ET Placement Right   Secured By Commercial tube pritchett Sepsis

## 2021-05-10 NOTE — PHYSICAL THERAPY INITIAL EVALUATION ADULT - PATIENT/FAMILY AGREES WITH PLAN
Sub acute rehab. However patient prefers d/c home. If patient d/c home,would recommend home with Home PT and assist for all functional mobility./no

## 2021-05-11 LAB
ANION GAP SERPL CALC-SCNC: 13 MMOL/L — SIGNIFICANT CHANGE UP (ref 5–17)
BUN SERPL-MCNC: 12 MG/DL — SIGNIFICANT CHANGE UP (ref 7–23)
CALCIUM SERPL-MCNC: 9.3 MG/DL — SIGNIFICANT CHANGE UP (ref 8.4–10.5)
CHLORIDE SERPL-SCNC: 104 MMOL/L — SIGNIFICANT CHANGE UP (ref 96–108)
CO2 SERPL-SCNC: 20 MMOL/L — LOW (ref 22–31)
CREAT SERPL-MCNC: 1.24 MG/DL — SIGNIFICANT CHANGE UP (ref 0.5–1.3)
CRP SERPL-MCNC: 59 MG/L — HIGH (ref 0–4)
ERYTHROCYTE [SEDIMENTATION RATE] IN BLOOD: 36 MM/HR — HIGH (ref 0–20)
GLUCOSE SERPL-MCNC: 94 MG/DL — SIGNIFICANT CHANGE UP (ref 70–99)
MAGNESIUM SERPL-MCNC: 1.4 MG/DL — LOW (ref 1.6–2.6)
PHOSPHATE SERPL-MCNC: 2.7 MG/DL — SIGNIFICANT CHANGE UP (ref 2.5–4.5)
POTASSIUM SERPL-MCNC: 4.3 MMOL/L — SIGNIFICANT CHANGE UP (ref 3.5–5.3)
POTASSIUM SERPL-SCNC: 4.3 MMOL/L — SIGNIFICANT CHANGE UP (ref 3.5–5.3)
SODIUM SERPL-SCNC: 137 MMOL/L — SIGNIFICANT CHANGE UP (ref 135–145)
TSH SERPL-MCNC: 11.2 UIU/ML — HIGH (ref 0.27–4.2)

## 2021-05-11 PROCEDURE — 99233 SBSQ HOSP IP/OBS HIGH 50: CPT

## 2021-05-11 RX ORDER — LANOLIN ALCOHOL/MO/W.PET/CERES
3 CREAM (GRAM) TOPICAL ONCE
Refills: 0 | Status: COMPLETED | OUTPATIENT
Start: 2021-05-11 | End: 2021-05-11

## 2021-05-11 RX ORDER — MAGNESIUM SULFATE 500 MG/ML
2 VIAL (ML) INJECTION ONCE
Refills: 0 | Status: COMPLETED | OUTPATIENT
Start: 2021-05-11 | End: 2021-05-11

## 2021-05-11 RX ADMIN — Medication 3 MILLIGRAM(S): at 21:56

## 2021-05-11 RX ADMIN — Medication 1000 UNIT(S): at 12:28

## 2021-05-11 RX ADMIN — Medication 500 MILLIGRAM(S): at 21:24

## 2021-05-11 RX ADMIN — Medication 3 MILLIGRAM(S): at 01:47

## 2021-05-11 RX ADMIN — Medication 1 TABLET(S): at 12:28

## 2021-05-11 RX ADMIN — Medication 500 MILLIGRAM(S): at 13:29

## 2021-05-11 RX ADMIN — LOSARTAN POTASSIUM 100 MILLIGRAM(S): 100 TABLET, FILM COATED ORAL at 05:55

## 2021-05-11 RX ADMIN — HEPARIN SODIUM 5000 UNIT(S): 5000 INJECTION INTRAVENOUS; SUBCUTANEOUS at 17:37

## 2021-05-11 RX ADMIN — Medication 500 MILLIGRAM(S): at 05:55

## 2021-05-11 RX ADMIN — ATORVASTATIN CALCIUM 40 MILLIGRAM(S): 80 TABLET, FILM COATED ORAL at 21:24

## 2021-05-11 RX ADMIN — Medication 50 GRAM(S): at 08:52

## 2021-05-11 RX ADMIN — Medication 137 MICROGRAM(S): at 05:55

## 2021-05-11 RX ADMIN — Medication 500 MILLIGRAM(S): at 05:56

## 2021-05-11 RX ADMIN — HEPARIN SODIUM 5000 UNIT(S): 5000 INJECTION INTRAVENOUS; SUBCUTANEOUS at 05:55

## 2021-05-12 ENCOUNTER — TRANSCRIPTION ENCOUNTER (OUTPATIENT)
Age: 86
End: 2021-05-12

## 2021-05-12 DIAGNOSIS — R26.9 UNSPECIFIED ABNORMALITIES OF GAIT AND MOBILITY: ICD-10-CM

## 2021-05-12 LAB
ALBUMIN SERPL ELPH-MCNC: 3.1 G/DL — LOW (ref 3.3–5)
ALP SERPL-CCNC: 56 U/L — SIGNIFICANT CHANGE UP (ref 40–120)
ALT FLD-CCNC: 27 U/L — SIGNIFICANT CHANGE UP (ref 10–45)
ANION GAP SERPL CALC-SCNC: 12 MMOL/L — SIGNIFICANT CHANGE UP (ref 5–17)
AST SERPL-CCNC: 54 U/L — HIGH (ref 10–40)
BILIRUB SERPL-MCNC: 0.4 MG/DL — SIGNIFICANT CHANGE UP (ref 0.2–1.2)
BUN SERPL-MCNC: 12 MG/DL — SIGNIFICANT CHANGE UP (ref 7–23)
CALCIUM SERPL-MCNC: 9.2 MG/DL — SIGNIFICANT CHANGE UP (ref 8.4–10.5)
CHLORIDE SERPL-SCNC: 104 MMOL/L — SIGNIFICANT CHANGE UP (ref 96–108)
CO2 SERPL-SCNC: 24 MMOL/L — SIGNIFICANT CHANGE UP (ref 22–31)
CREAT SERPL-MCNC: 1.28 MG/DL — SIGNIFICANT CHANGE UP (ref 0.5–1.3)
CULTURE RESULTS: SIGNIFICANT CHANGE UP
CULTURE RESULTS: SIGNIFICANT CHANGE UP
GLUCOSE SERPL-MCNC: 88 MG/DL — SIGNIFICANT CHANGE UP (ref 70–99)
HCT VFR BLD CALC: 37.2 % — SIGNIFICANT CHANGE UP (ref 34.5–45)
HGB BLD-MCNC: 12.3 G/DL — SIGNIFICANT CHANGE UP (ref 11.5–15.5)
MCHC RBC-ENTMCNC: 32.3 PG — SIGNIFICANT CHANGE UP (ref 27–34)
MCHC RBC-ENTMCNC: 33.1 GM/DL — SIGNIFICANT CHANGE UP (ref 32–36)
MCV RBC AUTO: 97.6 FL — SIGNIFICANT CHANGE UP (ref 80–100)
NRBC # BLD: 0 /100 WBCS — SIGNIFICANT CHANGE UP (ref 0–0)
PLATELET # BLD AUTO: 318 K/UL — SIGNIFICANT CHANGE UP (ref 150–400)
POTASSIUM SERPL-MCNC: 4.2 MMOL/L — SIGNIFICANT CHANGE UP (ref 3.5–5.3)
POTASSIUM SERPL-SCNC: 4.2 MMOL/L — SIGNIFICANT CHANGE UP (ref 3.5–5.3)
PROT SERPL-MCNC: 5.6 G/DL — LOW (ref 6–8.3)
RBC # BLD: 3.81 M/UL — SIGNIFICANT CHANGE UP (ref 3.8–5.2)
RBC # FLD: 14 % — SIGNIFICANT CHANGE UP (ref 10.3–14.5)
SARS-COV-2 RNA SPEC QL NAA+PROBE: SIGNIFICANT CHANGE UP
SODIUM SERPL-SCNC: 140 MMOL/L — SIGNIFICANT CHANGE UP (ref 135–145)
SPECIMEN SOURCE: SIGNIFICANT CHANGE UP
SPECIMEN SOURCE: SIGNIFICANT CHANGE UP
WBC # BLD: 6.19 K/UL — SIGNIFICANT CHANGE UP (ref 3.8–10.5)
WBC # FLD AUTO: 6.19 K/UL — SIGNIFICANT CHANGE UP (ref 3.8–10.5)

## 2021-05-12 PROCEDURE — 99233 SBSQ HOSP IP/OBS HIGH 50: CPT

## 2021-05-12 RX ORDER — METRONIDAZOLE 500 MG
1 TABLET ORAL
Qty: 12 | Refills: 0
Start: 2021-05-12 | End: 2021-05-15

## 2021-05-12 RX ORDER — CIPROFLOXACIN LACTATE 400MG/40ML
1 VIAL (ML) INTRAVENOUS
Qty: 4 | Refills: 0
Start: 2021-05-12 | End: 2021-05-15

## 2021-05-12 RX ADMIN — Medication 500 MILLIGRAM(S): at 05:17

## 2021-05-12 RX ADMIN — Medication 650 MILLIGRAM(S): at 13:24

## 2021-05-12 RX ADMIN — Medication 650 MILLIGRAM(S): at 13:54

## 2021-05-12 RX ADMIN — Medication 137 MICROGRAM(S): at 05:17

## 2021-05-12 RX ADMIN — HEPARIN SODIUM 5000 UNIT(S): 5000 INJECTION INTRAVENOUS; SUBCUTANEOUS at 05:17

## 2021-05-12 RX ADMIN — Medication 650 MILLIGRAM(S): at 01:44

## 2021-05-12 RX ADMIN — Medication 1 TABLET(S): at 13:23

## 2021-05-12 RX ADMIN — Medication 650 MILLIGRAM(S): at 01:14

## 2021-05-12 RX ADMIN — LOSARTAN POTASSIUM 100 MILLIGRAM(S): 100 TABLET, FILM COATED ORAL at 05:18

## 2021-05-12 RX ADMIN — Medication 1000 UNIT(S): at 13:23

## 2021-05-12 RX ADMIN — Medication 650 MILLIGRAM(S): at 22:45

## 2021-05-12 RX ADMIN — HEPARIN SODIUM 5000 UNIT(S): 5000 INJECTION INTRAVENOUS; SUBCUTANEOUS at 17:11

## 2021-05-12 RX ADMIN — Medication 500 MILLIGRAM(S): at 22:44

## 2021-05-12 RX ADMIN — ATORVASTATIN CALCIUM 40 MILLIGRAM(S): 80 TABLET, FILM COATED ORAL at 22:44

## 2021-05-12 RX ADMIN — Medication 500 MILLIGRAM(S): at 13:23

## 2021-05-12 RX ADMIN — Medication 650 MILLIGRAM(S): at 23:52

## 2021-05-12 RX ADMIN — Medication 500 MILLIGRAM(S): at 06:09

## 2021-05-12 NOTE — PROGRESS NOTE ADULT - ASSESSMENT
86 F w acute diarrhea    1. Acute diarrhea: CT suggestive of colitis. Most likely infectious etiology, less likely IBD. Doubt ischemia given lack of blood.  -short course of abx  -advance diet as tolerated  -symptoms continue to linger, may need to consider colonoscopy if no improvement    2. Bandemia: improved    3. HTN     
86 F w acute diarrhea    1. Acute diarrhea: CT suggestive of colitis. clinically improved. D/c planning. Complete total 5d course of abx    2. Bandemia: improved    3. HTN     
86F w/HTN, hypothyroidism, hyperlipidemia, recent COVID infection ( late Feb ’21) ,  p/w diarrhea admitted with sepsis due to  proctosigmoiditis and OLIVER
86 F w acute diarrhea    1. Acute diarrhea: CT suggestive of colitis. clinically improved. if no further diarrhea, can d/c tomorrow    2. Bandemia: improved    3. HTN     
86F w/HTN, hypothyroidism, hyperlipidemia, recent COVID infection ( late Feb ’21) ,  p/w diarrhea admitted with sepsis due to  proctosigmoiditis and OLIVER
86F w/HTN, hypothyroidism, hyperlipidemia, recent COVID infection ( late Feb ’21) ,  p/w diarrhea admitted with sepsis due to  proctosigmoiditis and OLIVER
86F w/HTN, hypothyroidism, hyperlipidemia, recent COVID infection ( late Feb ’21) ,  p/w diarrhea admitted with sepsis due to  proctosigmoiditis and OLIVER now resolving.

## 2021-05-12 NOTE — PROGRESS NOTE ADULT - PROBLEM SELECTOR PROBLEM 8
Need for prophylactic measure
Need for prophylactic measure
High cholesterol
Need for prophylactic measure

## 2021-05-12 NOTE — PROGRESS NOTE ADULT - PROBLEM SELECTOR PLAN 3
Discussed with PT that pt has sudden "freezing" while she ambulates.  Dicussed this with pt who agrees it does happen where she feels like she will fall then stops.  Also has noticed a tremor occasionally in her right hand.  While discussing with her I noticed a "pill rolling" tremor at rest.  I asked her if she has seen neuro- she said she has been planning to and made an appointment to see someone.  Discussed with her and her son that I am concerned she may be developing early parkinson's disease.  They agree and are aware and plan to follow up for formal testing with an outpatient neurologist.  -PT rec DIALLO if pt wants to go home should have home PT.  -Neuro inpatient would be less valuable until she is not longer infected and weak and can be formally evalauted by a neurologist who can follow symptoms.  -They agree to f/u as outpatient.

## 2021-05-12 NOTE — DISCHARGE NOTE PROVIDER - NSDCFUADDAPPT_GEN_ALL_CORE_FT
Should follow up with neurologist as planned for formal evaluation of gait disorder and new tremor.    Can follow up with GI within 2 weeks to ensure improvement in diarrhea and discuss need for colonoscopy.

## 2021-05-12 NOTE — PROGRESS NOTE ADULT - PROBLEM SELECTOR PLAN 9
dvt ppx: HSQ  Dispo: Diarrhea improving and pt would like to Dc home.  Will plan to DC home with home PT as family refusing rehab. Medically cleared.

## 2021-05-12 NOTE — DISCHARGE NOTE PROVIDER - NSDCCPCAREPLAN_GEN_ALL_CORE_FT
PRINCIPAL DISCHARGE DIAGNOSIS  Diagnosis: Diarrhea  Assessment and Plan of Treatment:        PRINCIPAL DISCHARGE DIAGNOSIS  Diagnosis: Diarrhea  Assessment and Plan of Treatment: Please follow up with GI Dr. Gutierres and your PCP within 1 week from discharge      SECONDARY DISCHARGE DIAGNOSES  Diagnosis: Hypothyroidism  Assessment and Plan of Treatment: Continue medications as prescribed  Please follow up with your primary care provider for repeat TSH check in 6 weeks.      Diagnosis: High cholesterol  Assessment and Plan of Treatment: Low salt, low fat, low cholesterol, diabetic diet if appropriate  Continue medication as prescribed  Exercise, increase your activity level  Please follow up withyour PCP within 1 week from discharge.       Diagnosis: Gait disturbance  Assessment and Plan of Treatment: Concern for early signs of parkinson's disease.    Please follow up for formal testing with an outpatient neurologist within 1 week from discharge    Diagnosis: OLIVER (acute kidney injury)  Assessment and Plan of Treatment: Resolved.     PRINCIPAL DISCHARGE DIAGNOSIS  Diagnosis: Diarrhea  Assessment and Plan of Treatment: Please follow up with GI Dr. Gutierres and your PCP within 1 week from discharge.  Please continue antibiotics through 5/16/21      SECONDARY DISCHARGE DIAGNOSES  Diagnosis: Hypothyroidism  Assessment and Plan of Treatment: Continue medications as prescribed  Please follow up with your primary care provider for repeat TSH check in 6 weeks.      Diagnosis: High cholesterol  Assessment and Plan of Treatment: Low salt, low fat, low cholesterol diet  Continue medication as prescribed  Exercise, increase your activity level  Please follow up with your PCP within 1 week from discharge.    Diagnosis: Hypertension  Assessment and Plan of Treatment: Continue to follow a low salt/sodium diet.  Perform physical activities as tolerated in consultation with your Primary Care Provider and physical therapist.  Take all medications as prescribed.  Follow up with your medical doctor for routine blood pressure monitoring at your next visit.  Notify your doctor if you have any of the following symptoms:  Dizziness, lightheadedness, blurry vision, headache, chest pain, or shortness of breath.      Diagnosis: Gait disturbance  Assessment and Plan of Treatment: Concern for early signs of parkinson's disease.    Please follow up for formal testing with an outpatient neurologist within 1 week from discharge    Diagnosis: OLIVER (acute kidney injury)  Assessment and Plan of Treatment: Resolved.

## 2021-05-12 NOTE — DISCHARGE NOTE PROVIDER - CARE PROVIDER_API CALL
Amari Gutierres)  Internal Medicine  [address] [place] [address]  Phone: [phone]  Fax: [phone]  Follow Up Time:    Amari Gutierres)  Internal Medicine  [address] [place] [address]  Phone: [phone]  Fax: [phone]  Follow Up Time:     Nghia Duarte  INTERNAL MEDICINE  [address] [place] [id number]  Phone: [phone]  Fax: [phone]  Follow Up Time: 1 week

## 2021-05-12 NOTE — DISCHARGE NOTE PROVIDER - HOSPITAL COURSE
Problem/Plan - 1:  ·  Problem: Proctosigmoiditis.  Plan: CT with Sigmoid and rectal bowel wall thickening compatible with proctosigmoiditis.  -nonbloody diarrhea improving  -GI PCR negative  -cdif negative  -lactate negative  -stool count  -GI consult rec outpt scope in 6 weeks   -antibiotics as below.      Problem/Plan - 2:  ·  Problem: Sepsis.  Plan: HR>90 with bandemia due to proctosigmoiditis  -BP stable, negative lactate  -bandemia improving 17 from 35, trend cbc  -c/w po cipro and flagyl for total 7 days (until 5/16)  -f/u GI recs.      Problem/Plan - 3:  ·  Problem: OLIVER (acute kidney injury).  Plan: pre-renal in setting of diarrhea   - creatinine downtrending  - encourage po hydration  - renal dose medications  - monitor bmp.      Problem/Plan - 4:  ·  Problem: Hypomagnesemia.  Plan: improving, replete, trend levels.      Problem/Plan - 5:  ·  Problem: Hypothyroidism.  Plan: - c/w home levothyroxine   - tsh wnl.      Problem/Plan - 6:  Problem: Hypertension. Plan: - c/w home losartan  - monitor bp.     Problem/Plan - 7:  ·  Problem: High cholesterol.  Plan: -c/w home atorvastatin.      86F w/HTN, hypothyroidism, hyperlipidemia, recent COVID infection ( late Feb ’21) ,  p/w diarrhea admitted with sepsis due to proctosigmoiditis and OLIVER. Patient was admitted for further medical management. CT noted with Sigmoid and rectal bowel wall thickening compatible with proctosigmoiditis. GI was consulted - recommending short course of PO cipro and flagyl through 5/16/2021 and outpatient colonoscopy in 6 weeks. Patient's non-bloody diarrhea improving. Patient also noted to have OLIVER - 	likely pre-renal in setting of diarrhea. Cr now stable.        Problem/Plan - 3:  ·  Problem: OLIVER (acute kidney injury).  Plan: pre-renal in setting of diarrhea   - creatinine downtrending  - encourage po hydration  - renal dose medications  - monitor bmp.      Problem/Plan - 4:  ·  Problem: Hypomagnesemia.  Plan: improving, replete, trend levels.      Problem/Plan - 5:  ·  Problem: Hypothyroidism.  Plan: - c/w home levothyroxine   - tsh wnl.      Problem/Plan - 6:  Problem: Hypertension. Plan: - c/w home losartan  - monitor bp.     Problem/Plan - 7:  ·  Problem: High cholesterol.  Plan: -c/w home atorvastatin.      86F w/HTN, hypothyroidism, hyperlipidemia, recent COVID infection ( late Feb ’21) ,  p/w diarrhea admitted with sepsis due to proctosigmoiditis and OLIVER. Patient was admitted for further medical management. CT noted with Sigmoid and rectal bowel wall thickening compatible with proctosigmoiditis. GI was consulted - recommending short course of PO cipro and flagyl through 5/16/2021 and outpatient colonoscopy in 6 weeks. Patient's non-bloody diarrhea improving. Patient also noted to have OLIVER - likely pre-renal in setting of diarrhea. Cr now stable. Patient noted by physical therapy to have sudden "freezing" while she ambulates. Patient and patient's son educated about concerning signs for parkinson's disease. They agree and are aware and plan to follow up for formal testing with an outpatient neurologist.      Discharge/Dispo/Med rec discussed with attending. Patient medically cleared for discharge to [facility] with outpatient follow up with PCP, GI, and Neurology.           86F w/HTN, hypothyroidism, hyperlipidemia, recent COVID infection ( late Feb ’21) ,  p/w diarrhea. Patient was admitted for further medical management. CT noted with Sigmoid and rectal bowel wall thickening compatible with proctosigmoiditis. GI was consulted - recommending short course of PO cipro and flagyl through 5/16/2021 and outpatient colonoscopy in 6 weeks. Patient's non-bloody diarrhea improved. Patient also noted to have OLIVER - likely pre-renal in setting of diarrhea. Cr now stable. Patient noted by physical therapy to have sudden "freezing" while she ambulates. Patient and patient's son educated about concerning signs for parkinson's disease. They agree and are aware, and plan to follow up for formal testing with an outpatient neurologist.      Discharge/Dispo/Med rec discussed with attending. Patient medically cleared for discharge to [facility] with outpatient follow up with PCP, GI, and Neurology.           86F w/HTN, hypothyroidism, hyperlipidemia, recent COVID infection ( late Feb ’21),  presents with diarrhea. Patient was admitted for further medical management. CT noted with Sigmoid and rectal bowel wall thickening compatible with proctosigmoiditis. GI was consulted - recommending short course of PO cipro and flagyl through 5/16/2021 and outpatient colonoscopy in 6 weeks. Patient's non-bloody diarrhea improved. Patient also noted to have OLIVER - likely pre-renal in setting of diarrhea. Cr now stable. Patient noted by physical therapy to have sudden "freezing" while she ambulates. Patient and patient's son educated about concerning signs for parkinson's disease. They agree and are aware, and plan to follow up for formal testing with an outpatient neurologist.      Discharge/Dispo/Med rec discussed with attending. Patient medically cleared for discharge to [facility] with outpatient follow up with PCP, GI, and Neurology.

## 2021-05-12 NOTE — PROGRESS NOTE ADULT - PROBLEM SELECTOR PLAN 8
3
dvt ppx: HSQ  Dispo: if diarrhea still improved today.  Possibly 5/12, discussed with son who doesn't want DIALLO wants home pt.
-c/w home atorvastatin
dvt ppx: HSQ  Dispo: home when diarrhea improves and pending PT eval. Possibly 5/11
dvt ppx: HSQ  Dispo: home [facility] if stable and improving  discussed with NP Julia

## 2021-05-12 NOTE — PROGRESS NOTE ADULT - PROBLEM SELECTOR PLAN 6
- c/w home levothyroxine   - tsh wnl but repeat shows 11?  May be in error- not symptatomic at this point and should f/u with her outpt provider for repeat in 6 weeks.

## 2021-05-12 NOTE — PROGRESS NOTE ADULT - PROBLEM SELECTOR PLAN 1
CT with Sigmoid and rectal bowel wall thickening compatible with proctosigmoiditis.  -nonbloody diarrhea improving  -GI PCR negative  -cdif negative  -lactate negative  -stool count  -GI consult rec outpt scope in 6 weeks   -antibiotics as below

## 2021-05-12 NOTE — DISCHARGE NOTE PROVIDER - NSDCPNSUBOBJ_GEN_ALL_CORE
Pt admitted for acute gastroenteritis improved with Cipro and Flagyl should continue until 5/14.  Also discussed her needing to follow up with neurology for formal diagnosis of gait instability which she said she has been planning to do and has a neurologist who she will see. Also related that information to her son who agrees and will follow up after rehab.  Discharge time 45 minutes.  OF note she was found to have a contaminated blood culture on May 9th but no fevers, chills or leukocytosis and no signs of infection.  Do not suspect this is a true infection, rather a contaminated sample.

## 2021-05-12 NOTE — PROGRESS NOTE ADULT - SUBJECTIVE AND OBJECTIVE BOX
Chief Complaint:  Patient is a 86y old  Female who presents with a chief complaint of diarrhea x few days (09 May 2021 18:33)      Date of service 05-11-21 @ 13:26      Interval Events:   much improved    Hospital Medications:  acetaminophen   Tablet .. 650 milliGRAM(s) Oral every 4 hours PRN  atorvastatin 40 milliGRAM(s) Oral at bedtime  cholecalciferol 1000 Unit(s) Oral daily  ciprofloxacin     Tablet 500 milliGRAM(s) Oral every 24 hours  heparin   Injectable 5000 Unit(s) SubCutaneous two times a day  levothyroxine 137 MICROGram(s) Oral daily  losartan 100 milliGRAM(s) Oral daily  metroNIDAZOLE    Tablet 500 milliGRAM(s) Oral every 8 hours  multivitamin 1 Tablet(s) Oral daily        Review of Systems:  General:  No wt loss, fevers, chills, night sweats, fatigue,   Eyes:  Good vision, no reported pain  ENT:  No sore throat, pain, runny nose, dysphagia  CV:  No pain, palpitations, hypo/hypertension  Resp:  No dyspnea, cough, tachypnea, wheezing  GI:  See HPI  :  No pain, bleeding, incontinence, nocturia  Muscle:  No pain, weakness  Neuro:  No weakness, tingling, memory problems  Psych:  No fatigue, insomnia, mood problems, depression  Endocrine:  No polyuria, polydipsia, cold/heat intolerance  Heme:  No petechiae, ecchymosis, easy bruisability  Integumentary:  No rash, edema    PHYSICAL EXAM:   Vital Signs:  Vital Signs Last 24 Hrs  T(C): 36.7 (10 May 2021 12:30), Max: 36.9 (10 May 2021 05:26)  T(F): 98.1 (10 May 2021 12:30), Max: 98.4 (10 May 2021 05:26)  HR: 91 (10 May 2021 12:30) (79 - 96)  BP: 99/62 (10 May 2021 12:30) (99/62 - 149/84)  BP(mean): --  RR: 18 (10 May 2021 12:30) (18 - 18)  SpO2: 96% (10 May 2021 12:30) (93% - 96%)  Daily     Daily       PHYSICAL EXAM:     GENERAL:  Appears stated age, well-groomed, well-nourished, no distress  HEENT:  NC/AT,  conjunctivae anicteric, clear and pink,   NECK: supple, trachea midline  CHEST:  Full & symmetric excursion, no increased effort, breath sounds clear  HEART:  Regular rhythm, no JVD  ABDOMEN:  Soft, non-tender, non-distended, normoactive bowel sounds,  no masses , no hepatosplenomegaly  EXTREMITIES:  no cyanosis,clubbing or edema  SKIN:  No rash, erythema, or, ecchymoses, no jaundice  NEURO:  Alert, non-focal, no asterixis  PSYCH: Appropriate affect, oriented to place and time  RECTAL: Deferred      LABS Personally reviewed by me:                        12.4   5.78  )-----------( 279      ( 10 May 2021 07:15 )             36.9     Mean Cell Volume: 95.8 fl (05-10-21 @ 07:15)    05-10    138  |  102  |  15  ----------------------------<  90  4.0   |  22  |  1.22    Ca    9.4      10 May 2021 07:14  Phos  2.1     05-09  Mg     1.6     05-09    TPro  6.0  /  Alb  3.4  /  TBili  0.8  /  DBili  x   /  AST  56<H>  /  ALT  21  /  AlkPhos  63  05-09    LIVER FUNCTIONS - ( 09 May 2021 06:37 )  Alb: 3.4 g/dL / Pro: 6.0 g/dL / ALK PHOS: 63 U/L / ALT: 21 U/L / AST: 56 U/L / GGT: x                                       12.4   5.78  )-----------( 279      ( 10 May 2021 07:15 )             36.9                         12.5   5.65  )-----------( 274      ( 09 May 2021 06:37 )             37.3                         13.7   8.60  )-----------( 317      ( 08 May 2021 20:49 )             41.7       Imaging personally reviewed by me:          
  Chief Complaint:  Patient is a 86y old  Female who presents with a chief complaint of diarrhea x few days (09 May 2021 18:33)      Date of service 05-10-21 @ 13:26      Interval Events:   still w some diarrhea    Hospital Medications:  acetaminophen   Tablet .. 650 milliGRAM(s) Oral every 4 hours PRN  atorvastatin 40 milliGRAM(s) Oral at bedtime  cholecalciferol 1000 Unit(s) Oral daily  ciprofloxacin     Tablet 500 milliGRAM(s) Oral every 24 hours  heparin   Injectable 5000 Unit(s) SubCutaneous two times a day  levothyroxine 137 MICROGram(s) Oral daily  losartan 100 milliGRAM(s) Oral daily  metroNIDAZOLE    Tablet 500 milliGRAM(s) Oral every 8 hours  multivitamin 1 Tablet(s) Oral daily        Review of Systems:  General:  No wt loss, fevers, chills, night sweats, fatigue,   Eyes:  Good vision, no reported pain  ENT:  No sore throat, pain, runny nose, dysphagia  CV:  No pain, palpitations, hypo/hypertension  Resp:  No dyspnea, cough, tachypnea, wheezing  GI:  See HPI  :  No pain, bleeding, incontinence, nocturia  Muscle:  No pain, weakness  Neuro:  No weakness, tingling, memory problems  Psych:  No fatigue, insomnia, mood problems, depression  Endocrine:  No polyuria, polydipsia, cold/heat intolerance  Heme:  No petechiae, ecchymosis, easy bruisability  Integumentary:  No rash, edema    PHYSICAL EXAM:   Vital Signs:  Vital Signs Last 24 Hrs  T(C): 36.7 (10 May 2021 12:30), Max: 36.9 (10 May 2021 05:26)  T(F): 98.1 (10 May 2021 12:30), Max: 98.4 (10 May 2021 05:26)  HR: 91 (10 May 2021 12:30) (79 - 96)  BP: 99/62 (10 May 2021 12:30) (99/62 - 149/84)  BP(mean): --  RR: 18 (10 May 2021 12:30) (18 - 18)  SpO2: 96% (10 May 2021 12:30) (93% - 96%)  Daily     Daily       PHYSICAL EXAM:     GENERAL:  Appears stated age, well-groomed, well-nourished, no distress  HEENT:  NC/AT,  conjunctivae anicteric, clear and pink,   NECK: supple, trachea midline  CHEST:  Full & symmetric excursion, no increased effort, breath sounds clear  HEART:  Regular rhythm, no JVD  ABDOMEN:  Soft, non-tender, non-distended, normoactive bowel sounds,  no masses , no hepatosplenomegaly  EXTREMITIES:  no cyanosis,clubbing or edema  SKIN:  No rash, erythema, or, ecchymoses, no jaundice  NEURO:  Alert, non-focal, no asterixis  PSYCH: Appropriate affect, oriented to place and time  RECTAL: Deferred      LABS Personally reviewed by me:                        12.4   5.78  )-----------( 279      ( 10 May 2021 07:15 )             36.9     Mean Cell Volume: 95.8 fl (05-10-21 @ 07:15)    05-10    138  |  102  |  15  ----------------------------<  90  4.0   |  22  |  1.22    Ca    9.4      10 May 2021 07:14  Phos  2.1     05-09  Mg     1.6     05-09    TPro  6.0  /  Alb  3.4  /  TBili  0.8  /  DBili  x   /  AST  56<H>  /  ALT  21  /  AlkPhos  63  05-09    LIVER FUNCTIONS - ( 09 May 2021 06:37 )  Alb: 3.4 g/dL / Pro: 6.0 g/dL / ALK PHOS: 63 U/L / ALT: 21 U/L / AST: 56 U/L / GGT: x                                       12.4   5.78  )-----------( 279      ( 10 May 2021 07:15 )             36.9                         12.5   5.65  )-----------( 274      ( 09 May 2021 06:37 )             37.3                         13.7   8.60  )-----------( 317      ( 08 May 2021 20:49 )             41.7       Imaging personally reviewed by me:          
  Chief Complaint:  Patient is a 86y old  Female who presents with a chief complaint of diarrhea x few days (12 May 2021 13:43)      Date of service 05-12-21 @ 21:49      Interval Events: stool becoming formed    Hospital Medications:  acetaminophen   Tablet .. 650 milliGRAM(s) Oral every 4 hours PRN  atorvastatin 40 milliGRAM(s) Oral at bedtime  cholecalciferol 1000 Unit(s) Oral daily  ciprofloxacin     Tablet 500 milliGRAM(s) Oral every 24 hours  heparin   Injectable 5000 Unit(s) SubCutaneous two times a day  levothyroxine 137 MICROGram(s) Oral daily  losartan 100 milliGRAM(s) Oral daily  metroNIDAZOLE    Tablet 500 milliGRAM(s) Oral every 8 hours  multivitamin 1 Tablet(s) Oral daily        Review of Systems:  General:  No wt loss, fevers, chills, night sweats, fatigue,   Eyes:  Good vision, no reported pain  ENT:  No sore throat, pain, runny nose, dysphagia  CV:  No pain, palpitations, hypo/hypertension  Resp:  No dyspnea, cough, tachypnea, wheezing  GI:  See HPI  :  No pain, bleeding, incontinence, nocturia  Muscle:  No pain, weakness  Neuro:  No weakness, tingling, memory problems  Psych:  No fatigue, insomnia, mood problems, depression  Endocrine:  No polyuria, polydipsia, cold/heat intolerance  Heme:  No petechiae, ecchymosis, easy bruisability  Integumentary:  No rash, edema    PHYSICAL EXAM:   Vital Signs:  Vital Signs Last 24 Hrs  T(C): 36.2 (12 May 2021 21:22), Max: 36.7 (12 May 2021 04:31)  T(F): 97.2 (12 May 2021 21:22), Max: 98.1 (12 May 2021 04:31)  HR: 103 (12 May 2021 21:22) (69 - 103)  BP: 144/83 (12 May 2021 14:36) (116/74 - 144/83)  BP(mean): --  RR: 18 (12 May 2021 21:22) (18 - 18)  SpO2: 96% (12 May 2021 21:22) (95% - 96%)  Daily     Daily       PHYSICAL EXAM:     GENERAL:  Appears stated age, well-groomed, well-nourished, no distress  HEENT:  NC/AT,  conjunctivae anicteric, clear and pink,   NECK: supple, trachea midline  CHEST:  Full & symmetric excursion, no increased effort, breath sounds clear  HEART:  Regular rhythm, no JVD  ABDOMEN:  Soft, non-tender, non-distended, normoactive bowel sounds,  no masses , no hepatosplenomegaly  EXTREMITIES:  no cyanosis,clubbing or edema  SKIN:  No rash, erythema, or, ecchymoses, no jaundice  NEURO:  Alert, non-focal, no asterixis  PSYCH: Appropriate affect, oriented to place and time  RECTAL: Deferred      LABS Personally reviewed by me:                        12.3   6.19  )-----------( 318      ( 12 May 2021 07:13 )             37.2     Mean Cell Volume: 97.6 fl (05-12-21 @ 07:13)    05-12    140  |  104  |  12  ----------------------------<  88  4.2   |  24  |  1.28    Ca    9.2      12 May 2021 07:11  Phos  2.7     05-11  Mg     1.6     05-12    TPro  5.6<L>  /  Alb  3.1<L>  /  TBili  0.4  /  DBili  x   /  AST  54<H>  /  ALT  27  /  AlkPhos  56  05-12    LIVER FUNCTIONS - ( 12 May 2021 07:11 )  Alb: 3.1 g/dL / Pro: 5.6 g/dL / ALK PHOS: 56 U/L / ALT: 27 U/L / AST: 54 U/L / GGT: x                                       12.3   6.19  )-----------( 318      ( 12 May 2021 07:13 )             37.2                         12.4   5.78  )-----------( 279      ( 10 May 2021 07:15 )             36.9       Imaging personally reviewed by me:          
  Patient is a 86y old  Female who presents with a chief complaint of diarrhea x few days (08 May 2021 22:58)      SUBJECTIVE / OVERNIGHT EVENTS: No On events. Feels better says she only had 1 loose BM this AM. Denies f/c, n/v, abd pain, cp, sob. Wants to go home today      ADDITIONAL REVIEW OF SYSTEMS: Negative except for above    MEDICATIONS  (STANDING):  atorvastatin 40 milliGRAM(s) Oral at bedtime  cholecalciferol 1000 Unit(s) Oral daily  ciprofloxacin     Tablet 500 milliGRAM(s) Oral every 24 hours  heparin   Injectable 5000 Unit(s) SubCutaneous two times a day  levothyroxine 137 MICROGram(s) Oral daily  losartan 100 milliGRAM(s) Oral daily  metroNIDAZOLE    Tablet 500 milliGRAM(s) Oral every 8 hours  multivitamin 1 Tablet(s) Oral daily    MEDICATIONS  (PRN):  acetaminophen   Tablet .. 650 milliGRAM(s) Oral every 4 hours PRN Mild Pain (1 - 3)      CAPILLARY BLOOD GLUCOSE        I&O's Summary    08 May 2021 07:01  -  09 May 2021 07:00  --------------------------------------------------------  IN: 0 mL / OUT: 600 mL / NET: -600 mL    09 May 2021 07:01  -  09 May 2021 12:53  --------------------------------------------------------  IN: 0 mL / OUT: 500 mL / NET: -500 mL        PHYSICAL EXAM:  Vital Signs Last 24 Hrs  T(C): 36.6 (09 May 2021 12:28), Max: 36.8 (08 May 2021 20:14)  T(F): 97.8 (09 May 2021 12:28), Max: 98.3 (08 May 2021 23:30)  HR: 85 (09 May 2021 12:28) (82 - 97)  BP: 135/69 (09 May 2021 12:28) (120/78 - 154/91)  BP(mean): --  RR: 19 (09 May 2021 12:28) (19 - 20)  SpO2: 96% (09 May 2021 12:28) (96% - 100%)    PHYSICAL EXAM:  GENERAL: NAD, well-developed, nontoxic  HEAD:  Atraumatic, Normocephalic  NECK: Supple, No JVD  CHEST/LUNG: Clear to auscultation bilaterally; No wheeze  HEART: Regular rate and rhythm; No murmurs, rubs, or gallops  ABDOMEN: Soft, Nontender, Nondistended; Bowel sounds present  EXTREMITIES:  2+ Peripheral Pulses, No clubbing, cyanosis, or edema  PSYCH: AAOx3  NEUROLOGY: non-focal  SKIN: No rashes or lesions      LABS:                        12.5   5.65  )-----------( 274      ( 09 May 2021 06:37 )             37.3     05-09    139  |  104  |  22  ----------------------------<  103<H>  3.9   |  22  |  1.33<H>    Ca    9.5      09 May 2021 06:37  Phos  2.1     05-09  Mg     1.6     05-09    TPro  6.0  /  Alb  3.4  /  TBili  0.8  /  DBili  x   /  AST  56<H>  /  ALT  21  /  AlkPhos  63  05-09              GI PCR Panel, Stool (collected 09 May 2021 04:28)  Source: .Stool Feces  Final Report (09 May 2021 07:47):    GI PCR Results: NOT detected    *******Please Note:*******    GI panel PCR evaluates for:    Campylobacter, Plesiomonas shigelloides, Salmonella,    Vibrio, Yersinia enterocolitica, Enteroaggregative    Escherichia coli (EAEC), Enteropathogenic E.coli (EPEC),    Enterotoxigenic E. coli (ETEC) lt/st, Shiga-like    toxin-producing E. coli (STEC) stx1/stx2,    Shigella/ Enteroinvasive E. coli (EIEC), Cryptosporidium,    Cyclospora cayetanensis, Entamoeba histolytica,    Giardia lamblia, Adenovirus F 40/41, Astrovirus,    Norovirus GI/GII, Rotavirus A, Sapovirus     cdif negative    RADIOLOGY & ADDITIONAL TESTS:    Imaging Personally Reviewed: ct a/p: BONES: Degenerative changes.    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    Electrocardiogram Personally Reviewed:    COORDINATION OF CARE:  Care Discussed with Consultants/Other Providers [Y/N]:  Prior or Outpatient Records Reviewed [Y/N]:  
PROGRESS NOTE:   Johanna Rao DO  Hospitalist  Pager [phone]  After 5pm/weekends or if no answer ext: [phone]      Patient is a 86y old  Female who presents with a chief complaint of diarrhea x few days (10 May 2021 13:25)      SUBJECTIVE / OVERNIGHT EVENTS: Still having some diarrhea    ADDITIONAL REVIEW OF SYSTEMS:  no fever or chills  no n/v +diarrhea    MEDICATIONS  (STANDING):  atorvastatin 40 milliGRAM(s) Oral at bedtime  cholecalciferol 1000 Unit(s) Oral daily  ciprofloxacin     Tablet 500 milliGRAM(s) Oral every 24 hours  heparin   Injectable 5000 Unit(s) SubCutaneous two times a day  levothyroxine 137 MICROGram(s) Oral daily  losartan 100 milliGRAM(s) Oral daily  metroNIDAZOLE    Tablet 500 milliGRAM(s) Oral every 8 hours  multivitamin 1 Tablet(s) Oral daily    MEDICATIONS  (PRN):  acetaminophen   Tablet .. 650 milliGRAM(s) Oral every 4 hours PRN Mild Pain (1 - 3)      CAPILLARY BLOOD GLUCOSE        I&O's Summary    09 May 2021 07:01  -  10 May 2021 07:00  --------------------------------------------------------  IN: 200 mL / OUT: 1050 mL / NET: -850 mL        PHYSICAL EXAM:  Vital Signs Last 24 Hrs  T(C): 36.7 (10 May 2021 12:30), Max: 36.9 (10 May 2021 05:26)  T(F): 98.1 (10 May 2021 12:30), Max: 98.4 (10 May 2021 05:26)  HR: 91 (10 May 2021 12:30) (79 - 96)  BP: 99/62 (10 May 2021 12:30) (99/62 - 149/84)  BP(mean): --  RR: 18 (10 May 2021 12:30) (18 - 18)  SpO2: 96% (10 May 2021 12:30) (93% - 96%)    CONSTITUTIONAL: NAD, well-developed  RESPIRATORY: Normal respiratory effort; lungs are clear to auscultation bilaterally  CARDIOVASCULAR: Regular rate and rhythm, normal S1 and S2, no murmur/rub/gallop; No lower extremity edema; Peripheral pulses are 2+ bilaterally  ABDOMEN: Nontender to palpation, normoactive bowel sounds, no rebound/guarding; No hepatosplenomegaly  MUSCLOSKELETAL: no clubbing or cyanosis of digits; no joint swelling or tenderness to palpation  PSYCH: A+O to person, place, and time; affect appropriate    LABS:                        12.4   5.78  )-----------( 279      ( 10 May 2021 07:15 )             36.9     05-10    138  |  102  |  15  ----------------------------<  90  4.0   |  22  |  1.22    Ca    9.4      10 May 2021 07:14  Phos  2.1     05-09  Mg     1.6     05-09    TPro  6.0  /  Alb  3.4  /  TBili  0.8  /  DBili  x   /  AST  56<H>  /  ALT  21  /  AlkPhos  63  05-09              GI PCR Panel, Stool (collected 09 May 2021 04:28)  Source: .Stool Feces  Final Report (09 May 2021 07:47):    GI PCR Results: NOT detected    *******Please Note:*******    GI panel PCR evaluates for:    Campylobacter, Plesiomonas shigelloides, Salmonella,    Vibrio, Yersinia enterocolitica, Enteroaggregative    Escherichia coli (EAEC), Enteropathogenic E.coli (EPEC),    Enterotoxigenic E. coli (ETEC) lt/st, Shiga-like    toxin-producing E. coli (STEC) stx1/stx2,    Shigella/ Enteroinvasive E. coli (EIEC), Cryptosporidium,    Cyclospora cayetanensis, Entamoeba histolytica,    Giardia lamblia, Adenovirus F 40/41, Astrovirus,    Norovirus GI/GII, Rotavirus A, Sapovirus    Culture - Stool (collected 09 May 2021 04:28)  Source: .Stool Feces  Preliminary Report (10 May 2021 12:01):    No enteric pathogens to date: Final culture pending    Culture - Blood (collected 09 May 2021 01:16)  Source: .Blood Blood  Preliminary Report (10 May 2021 02:01):    No growth to date.    Culture - Blood (collected 09 May 2021 01:16)  Source: .Blood Blood  Preliminary Report (10 May 2021 02:01):    No growth to date.        RADIOLOGY & ADDITIONAL TESTS:  Results Reviewed:   Imaging Personally Reviewed:  Electrocardiogram Personally Reviewed:    COORDINATION OF CARE:  Care Discussed with Consultants/Other Providers [Y/N]:  Prior or Outpatient Records Reviewed [Y/N]:  
PROGRESS NOTE:   Johanna Rao DO  Hospitalist  Pager [phone]  After 5pm/weekends or if no answer ext: [phone]      Patient is a 86y old  Female who presents with a chief complaint of diarrhea x few days (12 May 2021 13:38)      SUBJECTIVE / OVERNIGHT EVENTS: Now having more formed stool, less often.  Overall improving.   ADDITIONAL REVIEW OF SYSTEMS:  no fever or chills  no n/v/d    MEDICATIONS  (STANDING):  atorvastatin 40 milliGRAM(s) Oral at bedtime  cholecalciferol 1000 Unit(s) Oral daily  ciprofloxacin     Tablet 500 milliGRAM(s) Oral every 24 hours  heparin   Injectable 5000 Unit(s) SubCutaneous two times a day  levothyroxine 137 MICROGram(s) Oral daily  losartan 100 milliGRAM(s) Oral daily  metroNIDAZOLE    Tablet 500 milliGRAM(s) Oral every 8 hours  multivitamin 1 Tablet(s) Oral daily    MEDICATIONS  (PRN):  acetaminophen   Tablet .. 650 milliGRAM(s) Oral every 4 hours PRN Mild Pain (1 - 3)      CAPILLARY BLOOD GLUCOSE        I&O's Summary    11 May 2021 07:01  -  12 May 2021 07:00  --------------------------------------------------------  IN: 240 mL / OUT: 0 mL / NET: 240 mL    12 May 2021 07:01  -  12 May 2021 13:44  --------------------------------------------------------  IN: 480 mL / OUT: 0 mL / NET: 480 mL        PHYSICAL EXAM:  Vital Signs Last 24 Hrs  T(C): 36.4 (12 May 2021 13:16), Max: 36.8 (11 May 2021 20:55)  T(F): 97.5 (12 May 2021 13:16), Max: 98.3 (11 May 2021 20:55)  HR: 71 (12 May 2021 13:16) (69 - 78)  BP: 137/83 (12 May 2021 13:16) (116/74 - 137/83)  BP(mean): --  RR: 18 (12 May 2021 13:16) (18 - 18)  SpO2: 95% (12 May 2021 13:16) (95% - 97%)    CONSTITUTIONAL: NAD, well-developed  RESPIRATORY: Normal respiratory effort; lungs are clear to auscultation bilaterally  CARDIOVASCULAR: Regular rate and rhythm, normal S1 and S2, no murmur/rub/gallop; No lower extremity edema; Peripheral pulses are 2+ bilaterally  ABDOMEN: Nontender to palpation, normoactive bowel sounds, no rebound/guarding; No hepatosplenomegaly  MUSCLOSKELETAL: no clubbing or cyanosis of digits; no joint swelling or tenderness to palpation  PSYCH: A+O to person, place, and time; affect appropriate  Neuro: pill rolling tremor noted.    LABS:                        12.3   6.19  )-----------( 318      ( 12 May 2021 07:13 )             37.2     05-12    140  |  104  |  12  ----------------------------<  88  4.2   |  24  |  1.28    Ca    9.2      12 May 2021 07:11  Phos  2.7     05-11  Mg     1.4     05-11    TPro  5.6<L>  /  Alb  3.1<L>  /  TBili  0.4  /  DBili  x   /  AST  54<H>  /  ALT  27  /  AlkPhos  56  05-12                RADIOLOGY & ADDITIONAL TESTS:  Results Reviewed:   Imaging Personally Reviewed:  Electrocardiogram Personally Reviewed:    COORDINATION OF CARE:  Care Discussed with Consultants/Other Providers [Y/N]:  Prior or Outpatient Records Reviewed [Y/N]:  
PROGRESS NOTE:   Johanna Rao DO  Hospitalist  Pager [phone]  After 5pm/weekends or if no answer ext: [phone]      Patient is a 86y old  Female who presents with a chief complaint of diarrhea x few days (10 May 2021 14:23)      SUBJECTIVE / OVERNIGHT EVENTS:DIarrhea improving    ADDITIONAL REVIEW OF SYSTEMS:  no fever or chills  no n/v/d    MEDICATIONS  (STANDING):  atorvastatin 40 milliGRAM(s) Oral at bedtime  cholecalciferol 1000 Unit(s) Oral daily  ciprofloxacin     Tablet 500 milliGRAM(s) Oral every 24 hours  heparin   Injectable 5000 Unit(s) SubCutaneous two times a day  levothyroxine 137 MICROGram(s) Oral daily  losartan 100 milliGRAM(s) Oral daily  metroNIDAZOLE    Tablet 500 milliGRAM(s) Oral every 8 hours  multivitamin 1 Tablet(s) Oral daily    MEDICATIONS  (PRN):  acetaminophen   Tablet .. 650 milliGRAM(s) Oral every 4 hours PRN Mild Pain (1 - 3)      CAPILLARY BLOOD GLUCOSE        I&O's Summary    10 May 2021 07:01  -  11 May 2021 07:00  --------------------------------------------------------  IN: 340 mL / OUT: 250 mL / NET: 90 mL        PHYSICAL EXAM:  Vital Signs Last 24 Hrs  T(C): 36.5 (11 May 2021 12:13), Max: 36.8 (10 May 2021 21:38)  T(F): 97.7 (11 May 2021 12:13), Max: 98.3 (10 May 2021 21:38)  HR: 82 (11 May 2021 12:13) (82 - 90)  BP: 92/57 (11 May 2021 12:13) (92/57 - 138/80)  BP(mean): --  RR: 18 (11 May 2021 12:13) (18 - 18)  SpO2: 94% (11 May 2021 12:13) (93% - 97%)    CONSTITUTIONAL: NAD, well-developed; non toxic appearing  RESPIRATORY: Normal respiratory effort; lungs are clear to auscultation bilaterally  CARDIOVASCULAR: Regular rate and rhythm, normal S1 and S2, no murmur/rub/gallop; No lower extremity edema; Peripheral pulses are 2+ bilaterally  ABDOMEN: Nontender to palpation, normoactive bowel sounds, no rebound/guarding; No hepatosplenomegaly  MUSCLOSKELETAL: no clubbing or cyanosis of digits; no joint swelling or tenderness to palpation  PSYCH: A+O to person, place, and time; affect appropriate    LABS:                        12.4   5.78  )-----------( 279      ( 10 May 2021 07:15 )             36.9     05-11    137  |  104  |  12  ----------------------------<  94  4.3   |  20<L>  |  1.24    Ca    9.3      11 May 2021 07:09  Phos  2.7     05-11  Mg     1.4     05-11                GI PCR Panel, Stool (collected 09 May 2021 04:28)  Source: .Stool Feces  Final Report (09 May 2021 07:47):    GI PCR Results: NOT detected    *******Please Note:*******    GI panel PCR evaluates for:    Campylobacter, Plesiomonas shigelloides, Salmonella,    Vibrio, Yersinia enterocolitica, Enteroaggregative    Escherichia coli (EAEC), Enteropathogenic E.coli (EPEC),    Enterotoxigenic E. coli (ETEC) lt/st, Shiga-like    toxin-producing E. coli (STEC) stx1/stx2,    Shigella/ Enteroinvasive E. coli (EIEC), Cryptosporidium,    Cyclospora cayetanensis, Entamoeba histolytica,    Giardia lamblia, Adenovirus F 40/41, Astrovirus,    Norovirus GI/GII, Rotavirus A, Sapovirus    Culture - Stool (collected 09 May 2021 04:28)  Source: .Stool Feces  Preliminary Report (10 May 2021 12:01):    No enteric pathogens to date: Final culture pending    Culture - Blood (collected 09 May 2021 01:16)  Source: .Blood Blood  Preliminary Report (10 May 2021 02:01):    No growth to date.    Culture - Blood (collected 09 May 2021 01:16)  Source: .Blood Blood  Preliminary Report (10 May 2021 02:01):    No growth to date.        RADIOLOGY & ADDITIONAL TESTS:  Results Reviewed:   Imaging Personally Reviewed:  Electrocardiogram Personally Reviewed:    COORDINATION OF CARE:  Care Discussed with Consultants/Other Providers [Y/N]:  Prior or Outpatient Records Reviewed [Y/N]:

## 2021-05-12 NOTE — PROGRESS NOTE ADULT - REASON FOR ADMISSION
diarrhea x few days

## 2021-05-12 NOTE — DISCHARGE NOTE PROVIDER - NSDCMRMEDTOKEN_GEN_ALL_CORE_FT
atorvastatin 40 mg oral tablet: 1 tab(s) orally once a day  ciprofloxacin 500 mg oral tablet: 1 tab(s) orally every 24 hours  levothyroxine 137 mcg (0.137 mg) oral capsule: 1 cap(s) orally once a day  losartan 100 mg oral tablet: 1 tab(s) orally once a day  metroNIDAZOLE 500 mg oral tablet: 1 tab(s) orally every 8 hours  Multiple Vitamins oral tablet: 1 tab(s) orally once a day  Vitamin D3 1000 intl units oral capsule: 1 cap(s) orally once a day

## 2021-05-12 NOTE — CHART NOTE - NSCHARTNOTEFT_GEN_A_CORE
Discussed with patient regarding [employer] COVID vaccine. Son also at bedside  As per patient and pt's son, she received treatment with monoclonal antibodies within the past 90 days. Thus, patient does not qualify for J&J vaccine      Grace Fletcher PA-C  #[id number] Discussed with patient regarding [employer] COVID vaccine. Son also at bedside  As per patient and pt's son, she received treatment with monoclonal antibodies on 2/27/21 (within the past 90 days.) Thus, patient does not qualify for J&J vaccine      Grace Fletcher PA-C  #[id number]

## 2021-05-12 NOTE — PROGRESS NOTE ADULT - PROBLEM SELECTOR PLAN 2
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

## 2021-05-12 NOTE — DISCHARGE NOTE PROVIDER - PROVIDER TOKENS
PROVIDER:[TOKEN:[[id number]]] PROVIDER:[TOKEN:[[id number]]],PROVIDER:[TOKEN:[[id number]],FOLLOWUP:[1 week]]

## 2021-05-13 ENCOUNTER — TRANSCRIPTION ENCOUNTER (OUTPATIENT)
Age: 86
End: 2021-05-13

## 2021-05-13 VITALS
OXYGEN SATURATION: 94 % | TEMPERATURE: 98 F | HEART RATE: 86 BPM | DIASTOLIC BLOOD PRESSURE: 91 MMHG | RESPIRATION RATE: 18 BRPM | SYSTOLIC BLOOD PRESSURE: 157 MMHG

## 2021-05-13 LAB
-  COAGULASE NEGATIVE STAPHYLOCOCCUS: SIGNIFICANT CHANGE UP
ALBUMIN SERPL ELPH-MCNC: 3.3 G/DL — SIGNIFICANT CHANGE UP (ref 3.3–5)
ALP SERPL-CCNC: 55 U/L — SIGNIFICANT CHANGE UP (ref 40–120)
ALT FLD-CCNC: 28 U/L — SIGNIFICANT CHANGE UP (ref 10–45)
ANION GAP SERPL CALC-SCNC: 11 MMOL/L — SIGNIFICANT CHANGE UP (ref 5–17)
AST SERPL-CCNC: 60 U/L — HIGH (ref 10–40)
BILIRUB SERPL-MCNC: 0.3 MG/DL — SIGNIFICANT CHANGE UP (ref 0.2–1.2)
BUN SERPL-MCNC: 10 MG/DL — SIGNIFICANT CHANGE UP (ref 7–23)
CALCIUM SERPL-MCNC: 9.3 MG/DL — SIGNIFICANT CHANGE UP (ref 8.4–10.5)
CHLORIDE SERPL-SCNC: 104 MMOL/L — SIGNIFICANT CHANGE UP (ref 96–108)
CO2 SERPL-SCNC: 22 MMOL/L — SIGNIFICANT CHANGE UP (ref 22–31)
CREAT SERPL-MCNC: 1.11 MG/DL — SIGNIFICANT CHANGE UP (ref 0.5–1.3)
GLUCOSE SERPL-MCNC: 92 MG/DL — SIGNIFICANT CHANGE UP (ref 70–99)
GRAM STN FLD: SIGNIFICANT CHANGE UP
METHOD TYPE: SIGNIFICANT CHANGE UP
POTASSIUM SERPL-MCNC: 4 MMOL/L — SIGNIFICANT CHANGE UP (ref 3.5–5.3)
POTASSIUM SERPL-SCNC: 4 MMOL/L — SIGNIFICANT CHANGE UP (ref 3.5–5.3)
PROT SERPL-MCNC: 5.8 G/DL — LOW (ref 6–8.3)
SODIUM SERPL-SCNC: 137 MMOL/L — SIGNIFICANT CHANGE UP (ref 135–145)

## 2021-05-13 PROCEDURE — 82803 BLOOD GASES ANY COMBINATION: CPT

## 2021-05-13 PROCEDURE — 85027 COMPLETE CBC AUTOMATED: CPT

## 2021-05-13 PROCEDURE — 86769 SARS-COV-2 COVID-19 ANTIBODY: CPT

## 2021-05-13 PROCEDURE — 87046 STOOL CULTR AEROBIC BACT EA: CPT

## 2021-05-13 PROCEDURE — 96374 THER/PROPH/DIAG INJ IV PUSH: CPT

## 2021-05-13 PROCEDURE — 97116 GAIT TRAINING THERAPY: CPT

## 2021-05-13 PROCEDURE — 84295 ASSAY OF SERUM SODIUM: CPT

## 2021-05-13 PROCEDURE — 85014 HEMATOCRIT: CPT

## 2021-05-13 PROCEDURE — 87324 CLOSTRIDIUM AG IA: CPT

## 2021-05-13 PROCEDURE — 97162 PT EVAL MOD COMPLEX 30 MIN: CPT

## 2021-05-13 PROCEDURE — 87150 DNA/RNA AMPLIFIED PROBE: CPT

## 2021-05-13 PROCEDURE — 83735 ASSAY OF MAGNESIUM: CPT

## 2021-05-13 PROCEDURE — 99231 SBSQ HOSP IP/OBS SF/LOW 25: CPT

## 2021-05-13 PROCEDURE — 84100 ASSAY OF PHOSPHORUS: CPT

## 2021-05-13 PROCEDURE — U0005: CPT

## 2021-05-13 PROCEDURE — 84443 ASSAY THYROID STIM HORMONE: CPT

## 2021-05-13 PROCEDURE — 82330 ASSAY OF CALCIUM: CPT

## 2021-05-13 PROCEDURE — 74176 CT ABD & PELVIS W/O CONTRAST: CPT

## 2021-05-13 PROCEDURE — 80048 BASIC METABOLIC PNL TOTAL CA: CPT

## 2021-05-13 PROCEDURE — U0003: CPT

## 2021-05-13 PROCEDURE — 87040 BLOOD CULTURE FOR BACTERIA: CPT

## 2021-05-13 PROCEDURE — 87177 OVA AND PARASITES SMEARS: CPT

## 2021-05-13 PROCEDURE — 86140 C-REACTIVE PROTEIN: CPT

## 2021-05-13 PROCEDURE — 80053 COMPREHEN METABOLIC PANEL: CPT

## 2021-05-13 PROCEDURE — 97110 THERAPEUTIC EXERCISES: CPT

## 2021-05-13 PROCEDURE — 99239 HOSP IP/OBS DSCHRG MGMT >30: CPT

## 2021-05-13 PROCEDURE — 99285 EMERGENCY DEPT VISIT HI MDM: CPT

## 2021-05-13 PROCEDURE — 85018 HEMOGLOBIN: CPT

## 2021-05-13 PROCEDURE — 87077 CULTURE AEROBIC IDENTIFY: CPT

## 2021-05-13 PROCEDURE — 87045 FECES CULTURE AEROBIC BACT: CPT

## 2021-05-13 PROCEDURE — 83605 ASSAY OF LACTIC ACID: CPT

## 2021-05-13 PROCEDURE — 82435 ASSAY OF BLOOD CHLORIDE: CPT

## 2021-05-13 PROCEDURE — 85652 RBC SED RATE AUTOMATED: CPT

## 2021-05-13 PROCEDURE — 85025 COMPLETE CBC W/AUTO DIFF WBC: CPT

## 2021-05-13 PROCEDURE — 87507 IADNA-DNA/RNA PROBE TQ 12-25: CPT

## 2021-05-13 PROCEDURE — 87449 NOS EACH ORGANISM AG IA: CPT

## 2021-05-13 PROCEDURE — 84132 ASSAY OF SERUM POTASSIUM: CPT

## 2021-05-13 PROCEDURE — 82947 ASSAY GLUCOSE BLOOD QUANT: CPT

## 2021-05-13 RX ADMIN — Medication 137 MICROGRAM(S): at 05:11

## 2021-05-13 RX ADMIN — Medication 500 MILLIGRAM(S): at 05:10

## 2021-05-13 RX ADMIN — Medication 1000 UNIT(S): at 11:11

## 2021-05-13 RX ADMIN — LOSARTAN POTASSIUM 100 MILLIGRAM(S): 100 TABLET, FILM COATED ORAL at 05:11

## 2021-05-13 RX ADMIN — Medication 500 MILLIGRAM(S): at 13:24

## 2021-05-13 RX ADMIN — Medication 500 MILLIGRAM(S): at 05:11

## 2021-05-13 RX ADMIN — HEPARIN SODIUM 5000 UNIT(S): 5000 INJECTION INTRAVENOUS; SUBCUTANEOUS at 05:11

## 2021-05-13 RX ADMIN — Medication 1 TABLET(S): at 11:11

## 2021-05-13 NOTE — CHART NOTE - NSCHARTNOTEFT_GEN_A_CORE
Medicine NP Episodic Note    CC: Positive Blood Cultures     HPI:  Notified by RN of positive blood culture results as follows: Culture - Blood (05.09.21 @ 01:16)  Gram Stain: Growth in anaerobic bottle: Gram positive cocci in pairs    Pt. seen and assessed at bedside, in NAD, resting comfortably in bed.  Pt. non-toxic appearing, offered no specific complaints.      ROS:  CONSTITUTIONAL:  No fever, chills, rigors  GASTROINTESTINAL:  No abd pain, N/V.  Had one loose BM overnight.       Vital Signs Last 24 Hrs  T(C): 36.2 (12 May 2021 21:22), Max: 36.7 (12 May 2021 04:31)  T(F): 97.2 (12 May 2021 21:22), Max: 98.1 (12 May 2021 04:31)  HR: 90 (12 May 2021 22:19) (69 - 103)  BP: 144/79 (12 May 2021 22:19) (116/74 - 144/83)  BP(mean): --  RR: 18 (12 May 2021 21:22) (18 - 18)  SpO2: 96% (12 May 2021 21:22) (95% - 96%)      Physical Exam:  General: WN/WD NAD, AOx3, non-toxic appearing  CV: RRR, S1S2   Respiratory: CTAB, nonlabored  Psych: Appropriate affect       Labs:                        12.3   6.19  )-----------( 318      ( 12 May 2021 07:13 )             37.2     05-12    140  |  104  |  12  ----------------------------<  88  4.2   |  24  |  1.28    Ca    9.2      12 May 2021 07:11  Phos  2.7     05-11  Mg     1.6     05-12    TPro  5.6<L>  /  Alb  3.1<L>  /  TBili  0.4  /  DBili  x   /  AST  54<H>  /  ALT  27  /  AlkPhos  56  05-12      Assessment & Plan:  HPI:  86F w/ PMHx of HTN, Hypothyroidism, HLD, recent COVID infection (late Feb ’21),  p/w diarrhea admitted w/ Sepsis due to  proctosigmoiditis, currently on PO Cipro/Flagyl and OLIVER now resolving.  Now w/ positive blood cultures from 5/8/2021: growth in anaerobic bottle: gram positive cocci in pairs.    # Positive blood culture, possibly procurement contamination (Pt. has no leukocytosis; afebrile overnight)  > F/u blood cx PCR results  > Repeat blood cx x 2  > F/u am labs   > Monitor vital signs  > Consider ID consult     Will endorse to primary team in am.  Attending to follow.    Allie Elizabeth, JH-BC  [phone]   Dept of Medicine

## 2021-05-13 NOTE — CHART NOTE - NSCHARTNOTEFT_GEN_A_CORE
Request from Dr. Rao to facilitate patient discharge. Medication reconciliation reviewed, revised, and resolved with Dr. Rao who had medically cleared patient for discharge with follow-up as advised. Blood cultures are likely a contaminant. Please refer to discharge note for detailed hospital course. Patient is currently stable for discharge to [facility] at this time.    Contact #[phone]  Grace MCCORMACK  Dept of Medicine

## 2021-05-13 NOTE — DISCHARGE NOTE NURSING/CASE MANAGEMENT/SOCIAL WORK - PATIENT PORTAL LINK FT
You can access the FollowMyHealth Patient Portal offered by [facility] by registering at the following website: [email or web address]. By joining [facility]’s FollowMyHealth portal, you will also be able to view your health information using other applications (apps) compatible with our system.

## 2021-05-13 NOTE — PROVIDER CONTACT NOTE (CRITICAL VALUE NOTIFICATION) - TEST AND RESULT REPORTED:
preliminary blood culture results from May 8th, growth in anaerobic bottle gram positive cocci in pairs

## 2021-05-14 LAB
CULTURE RESULTS: SIGNIFICANT CHANGE UP
CULTURE RESULTS: SIGNIFICANT CHANGE UP
ORGANISM # SPEC MICROSCOPIC CNT: SIGNIFICANT CHANGE UP
ORGANISM # SPEC MICROSCOPIC CNT: SIGNIFICANT CHANGE UP
SPECIMEN SOURCE: SIGNIFICANT CHANGE UP
SPECIMEN SOURCE: SIGNIFICANT CHANGE UP

## 2021-05-18 LAB
CULTURE RESULTS: SIGNIFICANT CHANGE UP
CULTURE RESULTS: SIGNIFICANT CHANGE UP
SPECIMEN SOURCE: SIGNIFICANT CHANGE UP
SPECIMEN SOURCE: SIGNIFICANT CHANGE UP

## 2021-06-03 ENCOUNTER — APPOINTMENT (OUTPATIENT)
Dept: INTERNAL MEDICINE | Facility: CLINIC | Age: 86
End: 2021-06-03

## 2021-11-04 NOTE — ED ADULT NURSE NOTE - NSFALLRSKUNASSIST_ED_ALL_ED
[facility]  EMERGENCY DEPARTMENTENCOUNTER      Pt Name: Robin Vega  MRN: [id number]  [place] 1966  Date ofevaluation: 11/4/2021  Provider: Alyx Roblero MD    CHIEF COMPLAINT       Chief Complaint   Patient presents with    Chest Pain         HISTORY OF PRESENT ILLNESS   (Location/Symptom, Timing/Onset,Context/Setting, Quality, Duration, Modifying Factors, Severity)  Note limiting factors. Robin Vega is a 54 y.o. female  who  has a past medical history of Anxiety, Cervical cancer ([facility] [address] COPD (chronic obstructive pulmonary disease) ([address] Depression, Functional ovarian cysts, History of DVT (deep vein thrombosis), Hypertension, Insomnia, Panic attacks, Psoriasis, Pulmonary embolism ([address] and Thyroid disease. 80-year-old female who presents with acute onset episode of anxiety associated with shortness of breath and chest tightness. Patient states that she recently found out that she has cervical cancer and goes to the doctor tomorrow to figure out what the treatment plan is. She was unable to sleep because she was thinking about her cancer this caused her to become very anxious and then briefly she felt very short of breath with some associated chest tightness. Patient states that her symptoms are partially resolved at this time but she still is is having chest pain. Does feel like occasionally she still has inability to catch her breath. But patient just feeling very anxious. Thinking about her appointment tomorrow makes her symptoms worse. Taking deep breaths seems to make her symptoms better. Risk factors is history of anxiety, COPD, depression, and history of prior DVT, PE.          NursingNotes were reviewed. REVIEW OF SYSTEMS    (2-9 systems for level 4, 10 or more for level 5)     Review of Systems   Constitutional: Negative. Negative for fever. HENT: Negative. Negative for congestion and sore throat.     Respiratory: Positive for -- --       Physical Exam  Vitals and nursing note reviewed. Constitutional:       General: She is not in acute distress. Appearance: She is not toxic-appearing or diaphoretic. HENT:      Head: Normocephalic and atraumatic. Mouth/Throat:      Mouth: Mucous membranes are moist.   Eyes:      Extraocular Movements: Extraocular movements intact. Pupils: Pupils are equal, round, and reactive to light. Cardiovascular:      Rate and Rhythm: Normal rate and regular rhythm. Heart sounds: Normal heart sounds. Pulmonary:      Effort: Pulmonary effort is normal.      Breath sounds: Normal breath sounds. Abdominal:      General: Abdomen is flat. Bowel sounds are normal.      Palpations: Abdomen is soft. Tenderness: There is no abdominal tenderness. Skin:     General: Skin is warm and dry. Capillary Refill: Capillary refill takes less than 2 seconds. Neurological:      General: No focal deficit present. Mental Status: She is alert and oriented to person, place, and time. Psychiatric:         Attention and Perception: Attention normal.         Mood and Affect: Mood is anxious. Speech: Speech normal.         Behavior: Behavior normal. Behavior is cooperative.          RESULTS     EKG: All EKG's are interpreted by the Emergency Department Physician who either signs or Co-signsthis chart in the absence of a cardiologist.    EKG Interpretation    Interpreted by emergency department physician    Rhythm: normal sinus   Rate: normal  Axis: normal  Ectopy: none  Conduction: normal  ST Segments: normal  T Waves: no acute change  Q Waves: none    Clinical Impression: no acute changes    Darío Ceballos MD        RADIOLOGY:   Non-plain filmimages such as CT, Ultrasound and MRI are read by the radiologist.     Interpretation per the Radiologist below, if available at the time ofthis note:    [address]    (Results Pending)         ED BEDSIDE ULTRASOUND: Performed by ED Physician - none    LABS:  Labs Reviewed   CBC WITH AUTO DIFFERENTIAL - Abnormal; Notable for the following components:       Result Value    RBC 5.40 (*)     RDW 19.8 (*)     All other components within normal limits    Narrative:     Performed at:  [place] [address] [facility] [address]   Phone [phone]   COMPREHENSIVE METABOLIC PANEL W/ REFLEX TO MG FOR LOW K - Abnormal; Notable for the following components:    Sodium 133 (*)     Chloride 96 (*)     Glucose 106 (*)     Alkaline Phosphatase 141 (*)     All other components within normal limits    Narrative:     Performed at:  [facility]  [address] [facility] [address]   Phone [phone]   TROPONIN    Narrative:     Performed at:  [address]   Phone [phone]       All other labs were within normal range or not returned as of this dictation. EMERGENCY DEPARTMENT COURSE and DIFFERENTIAL DIAGNOSIS/MDM:   Vitals:    Vitals:    11/04/21 0530 11/04/21 0653   BP: 114/70 (!) 109/59   Pulse: 74 69   Resp: 16    Temp: 99 °F (37.2 °C)    TempSrc: Oral    SpO2: 95% 98%       Patient was given thefollowing medications:  Medications   diazePAM (VALIUM) tablet 5 mg (5 mg Oral Given 11/4/21 0536)   iopamidol (ISOVUE-370) 76 % injection 75 mL (75 mLs IntraVENous Given 11/4/21 9530)       ED COURSE & MEDICAL DECISION MAKING    Pertinent Labs & Imaging studies reviewed. (See chart for details)   -  Patient seen and evaluated in the emergency department. -  Triage and nursing notes reviewed and incorporated. -  Old chart records reviewed and incorporated. -  Differential diagnosis includes: Differential Diagnosis:  Anxiety, panic attack, acute Coronary Syndrome, Congestive Heart Failure, Myocardial Infarction, Pulmonary Embolus, Pneumonia, Pneumothorax, other    59-year-old Anxiety attack          DISPOSITION/PLAN   DISPOSITION        PATIENT REFERREDTO:  Karson Ennis MD  [address]  [address]  [phone]    Schedule an appointment as soon as possible for a visit         DISCHARGEMEDICATIONS:  New Prescriptions    No medications on file          (Please note that portions of this note were completed with a voice recognition program.  Efforts were made to edit the dictations but occasionally words are mis-transcribed.)    Dimitry Smith MD (electronically signed)  Attending Emergency Physician         Dimitry Smith MD  11/04/21 5730 no

## 2022-12-01 ENCOUNTER — OUTPATIENT (OUTPATIENT)
Dept: OUTPATIENT SERVICES | Facility: HOSPITAL | Age: 87
LOS: 1 days | End: 2022-12-01
Payer: MEDICARE

## 2022-12-01 ENCOUNTER — APPOINTMENT (OUTPATIENT)
Dept: CT IMAGING | Facility: CLINIC | Age: 87
End: 2022-12-01

## 2022-12-01 DIAGNOSIS — Z98.89 OTHER SPECIFIED POSTPROCEDURAL STATES: Chronic | ICD-10-CM

## 2022-12-01 DIAGNOSIS — Z90.5 ACQUIRED ABSENCE OF KIDNEY: Chronic | ICD-10-CM

## 2022-12-01 DIAGNOSIS — Z96.653 PRESENCE OF ARTIFICIAL KNEE JOINT, BILATERAL: Chronic | ICD-10-CM

## 2022-12-01 DIAGNOSIS — Z00.8 ENCOUNTER FOR OTHER GENERAL EXAMINATION: ICD-10-CM

## 2022-12-01 PROCEDURE — 72131 CT LUMBAR SPINE W/O DYE: CPT | Mod: MH

## 2022-12-01 PROCEDURE — 72131 CT LUMBAR SPINE W/O DYE: CPT | Mod: 26,MH

## 2023-04-01 ENCOUNTER — INPATIENT (INPATIENT)
Facility: HOSPITAL | Age: 88
LOS: 2 days | Discharge: HOME | End: 2023-04-04
Attending: STUDENT IN AN ORGANIZED HEALTH CARE EDUCATION/TRAINING PROGRAM | Admitting: STUDENT IN AN ORGANIZED HEALTH CARE EDUCATION/TRAINING PROGRAM
Payer: MEDICARE

## 2023-04-01 VITALS
OXYGEN SATURATION: 100 % | SYSTOLIC BLOOD PRESSURE: 103 MMHG | HEART RATE: 88 BPM | DIASTOLIC BLOOD PRESSURE: 61 MMHG | TEMPERATURE: 98 F | RESPIRATION RATE: 18 BRPM

## 2023-04-01 DIAGNOSIS — Z96.653 PRESENCE OF ARTIFICIAL KNEE JOINT, BILATERAL: Chronic | ICD-10-CM

## 2023-04-01 DIAGNOSIS — Z98.89 OTHER SPECIFIED POSTPROCEDURAL STATES: Chronic | ICD-10-CM

## 2023-04-01 DIAGNOSIS — Z90.5 ACQUIRED ABSENCE OF KIDNEY: Chronic | ICD-10-CM

## 2023-04-01 PROCEDURE — 99291 CRITICAL CARE FIRST HOUR: CPT

## 2023-04-01 NOTE — ED ADULT TRIAGE NOTE - CHIEF COMPLAINT QUOTE
patient c/o rectal bleeding since yesterday. Pt said its been off and on for 2 days. Pt states "whole toilet bowl".  Denies chest pain sob n/v. Also complaining of weakness since bleeding. PMH parkinsons, nephrectomy. patient c/o rectal bleeding since yesterday. Pt said its been off and on for 2 days.  Denies chest pain sob n/v hemorrhoids. Also complaining of weakness since bleeding. PMH parkinsons, nephrectomy.

## 2023-04-02 DIAGNOSIS — M54.30 SCIATICA, UNSPECIFIED SIDE: ICD-10-CM

## 2023-04-02 DIAGNOSIS — K92.2 GASTROINTESTINAL HEMORRHAGE, UNSPECIFIED: ICD-10-CM

## 2023-04-02 DIAGNOSIS — E03.9 HYPOTHYROIDISM, UNSPECIFIED: ICD-10-CM

## 2023-04-02 DIAGNOSIS — G20 PARKINSON'S DISEASE: ICD-10-CM

## 2023-04-02 DIAGNOSIS — N17.9 ACUTE KIDNEY FAILURE, UNSPECIFIED: ICD-10-CM

## 2023-04-02 DIAGNOSIS — Z29.9 ENCOUNTER FOR PROPHYLACTIC MEASURES, UNSPECIFIED: ICD-10-CM

## 2023-04-02 DIAGNOSIS — J18.1 LOBAR PNEUMONIA, UNSPECIFIED ORGANISM: ICD-10-CM

## 2023-04-02 DIAGNOSIS — I10 ESSENTIAL (PRIMARY) HYPERTENSION: ICD-10-CM

## 2023-04-02 DIAGNOSIS — E78.5 HYPERLIPIDEMIA, UNSPECIFIED: ICD-10-CM

## 2023-04-02 LAB
ALBUMIN SERPL ELPH-MCNC: 3.8 G/DL — SIGNIFICANT CHANGE UP (ref 3.3–5)
ALP SERPL-CCNC: 58 U/L — SIGNIFICANT CHANGE UP (ref 40–120)
ALT FLD-CCNC: 15 U/L — SIGNIFICANT CHANGE UP (ref 4–33)
ANION GAP SERPL CALC-SCNC: 14 MMOL/L — SIGNIFICANT CHANGE UP (ref 7–14)
ANISOCYTOSIS BLD QL: SLIGHT — SIGNIFICANT CHANGE UP
APTT BLD: 23.3 SEC — LOW (ref 27–36.3)
AST SERPL-CCNC: 23 U/L — SIGNIFICANT CHANGE UP (ref 4–32)
BASE EXCESS BLDV CALC-SCNC: -4.8 MMOL/L — LOW (ref -2–3)
BASOPHILS # BLD AUTO: 0 K/UL — SIGNIFICANT CHANGE UP (ref 0–0.2)
BASOPHILS NFR BLD AUTO: 0 % — SIGNIFICANT CHANGE UP (ref 0–2)
BILIRUB SERPL-MCNC: 0.3 MG/DL — SIGNIFICANT CHANGE UP (ref 0.2–1.2)
BLD GP AB SCN SERPL QL: NEGATIVE — SIGNIFICANT CHANGE UP
BLOOD GAS VENOUS COMPREHENSIVE RESULT: SIGNIFICANT CHANGE UP
BUN SERPL-MCNC: 86 MG/DL — HIGH (ref 7–23)
CALCIUM SERPL-MCNC: 10.1 MG/DL — SIGNIFICANT CHANGE UP (ref 8.4–10.5)
CHLORIDE BLDV-SCNC: 107 MMOL/L — SIGNIFICANT CHANGE UP (ref 96–108)
CHLORIDE SERPL-SCNC: 101 MMOL/L — SIGNIFICANT CHANGE UP (ref 98–107)
CO2 BLDV-SCNC: 22.4 MMOL/L — SIGNIFICANT CHANGE UP (ref 22–26)
CO2 SERPL-SCNC: 20 MMOL/L — LOW (ref 22–31)
CREAT ?TM UR-MCNC: 62 MG/DL — SIGNIFICANT CHANGE UP
CREAT SERPL-MCNC: 1.61 MG/DL — HIGH (ref 0.5–1.3)
DACRYOCYTES BLD QL SMEAR: SLIGHT — SIGNIFICANT CHANGE UP
EGFR: 31 ML/MIN/1.73M2 — LOW
EOSINOPHIL # BLD AUTO: 0 K/UL — SIGNIFICANT CHANGE UP (ref 0–0.5)
EOSINOPHIL NFR BLD AUTO: 0 % — SIGNIFICANT CHANGE UP (ref 0–6)
FLUAV AG NPH QL: SIGNIFICANT CHANGE UP
FLUBV AG NPH QL: SIGNIFICANT CHANGE UP
GAS PNL BLDV: 137 MMOL/L — SIGNIFICANT CHANGE UP (ref 136–145)
GIANT PLATELETS BLD QL SMEAR: PRESENT — SIGNIFICANT CHANGE UP
GLUCOSE BLDV-MCNC: 129 MG/DL — HIGH (ref 70–99)
GLUCOSE SERPL-MCNC: 130 MG/DL — HIGH (ref 70–99)
HCO3 BLDV-SCNC: 21 MMOL/L — LOW (ref 22–29)
HCT VFR BLD CALC: 23.5 % — LOW (ref 34.5–45)
HCT VFR BLD CALC: 24.8 % — LOW (ref 34.5–45)
HCT VFR BLD CALC: 24.8 % — LOW (ref 34.5–45)
HCT VFR BLDA CALC: 23 % — LOW (ref 34.5–46.5)
HGB BLD CALC-MCNC: 7.7 G/DL — LOW (ref 11.7–16.1)
HGB BLD-MCNC: 7.8 G/DL — LOW (ref 11.5–15.5)
HGB BLD-MCNC: 8 G/DL — LOW (ref 11.5–15.5)
HGB BLD-MCNC: 8.2 G/DL — LOW (ref 11.5–15.5)
IANC: 22.1 K/UL — HIGH (ref 1.8–7.4)
INR BLD: 1.11 RATIO — SIGNIFICANT CHANGE UP (ref 0.88–1.16)
LACTATE BLDV-MCNC: 1.9 MMOL/L — SIGNIFICANT CHANGE UP (ref 0.5–2)
LIDOCAIN IGE QN: 41 U/L — SIGNIFICANT CHANGE UP (ref 7–60)
LYMPHOCYTES # BLD AUTO: 1.62 K/UL — SIGNIFICANT CHANGE UP (ref 1–3.3)
LYMPHOCYTES # BLD AUTO: 6.1 % — LOW (ref 13–44)
MACROCYTES BLD QL: SLIGHT — SIGNIFICANT CHANGE UP
MANUAL SMEAR VERIFICATION: SIGNIFICANT CHANGE UP
MCHC RBC-ENTMCNC: 31.4 PG — SIGNIFICANT CHANGE UP (ref 27–34)
MCHC RBC-ENTMCNC: 31.8 PG — SIGNIFICANT CHANGE UP (ref 27–34)
MCHC RBC-ENTMCNC: 31.9 PG — SIGNIFICANT CHANGE UP (ref 27–34)
MCHC RBC-ENTMCNC: 32.3 GM/DL — SIGNIFICANT CHANGE UP (ref 32–36)
MCHC RBC-ENTMCNC: 33.1 GM/DL — SIGNIFICANT CHANGE UP (ref 32–36)
MCHC RBC-ENTMCNC: 33.2 GM/DL — SIGNIFICANT CHANGE UP (ref 32–36)
MCV RBC AUTO: 95 FL — SIGNIFICANT CHANGE UP (ref 80–100)
MCV RBC AUTO: 95.9 FL — SIGNIFICANT CHANGE UP (ref 80–100)
MCV RBC AUTO: 98.8 FL — SIGNIFICANT CHANGE UP (ref 80–100)
MONOCYTES # BLD AUTO: 0.45 K/UL — SIGNIFICANT CHANGE UP (ref 0–0.9)
MONOCYTES NFR BLD AUTO: 1.7 % — LOW (ref 2–14)
NEUTROPHILS # BLD AUTO: 24.19 K/UL — HIGH (ref 1.8–7.4)
NEUTROPHILS NFR BLD AUTO: 89.5 % — HIGH (ref 43–77)
NEUTS BAND # BLD: 1.8 % — SIGNIFICANT CHANGE UP (ref 0–6)
NRBC # BLD: 0 /100 WBCS — SIGNIFICANT CHANGE UP (ref 0–0)
NRBC # BLD: 0 /100 WBCS — SIGNIFICANT CHANGE UP (ref 0–0)
NRBC # BLD: 1 /100 — HIGH (ref 0–0)
NRBC # FLD: 0.04 K/UL — HIGH (ref 0–0)
NRBC # FLD: 0.04 K/UL — HIGH (ref 0–0)
OSMOLALITY UR: 525 MOSM/KG — SIGNIFICANT CHANGE UP (ref 50–1200)
OVALOCYTES BLD QL SMEAR: SLIGHT — SIGNIFICANT CHANGE UP
PCO2 BLDV: 42 MMHG — SIGNIFICANT CHANGE UP (ref 39–52)
PH BLDV: 7.31 — LOW (ref 7.32–7.43)
PLAT MORPH BLD: NORMAL — SIGNIFICANT CHANGE UP
PLATELET # BLD AUTO: 357 K/UL — SIGNIFICANT CHANGE UP (ref 150–400)
PLATELET # BLD AUTO: 367 K/UL — SIGNIFICANT CHANGE UP (ref 150–400)
PLATELET # BLD AUTO: 467 K/UL — HIGH (ref 150–400)
PLATELET COUNT - ESTIMATE: NORMAL — SIGNIFICANT CHANGE UP
PO2 BLDV: 26 MMHG — SIGNIFICANT CHANGE UP (ref 25–45)
POIKILOCYTOSIS BLD QL AUTO: SLIGHT — SIGNIFICANT CHANGE UP
POLYCHROMASIA BLD QL SMEAR: SLIGHT — SIGNIFICANT CHANGE UP
POTASSIUM BLDV-SCNC: 4.1 MMOL/L — SIGNIFICANT CHANGE UP (ref 3.5–5.1)
POTASSIUM SERPL-MCNC: 4.2 MMOL/L — SIGNIFICANT CHANGE UP (ref 3.5–5.3)
POTASSIUM SERPL-SCNC: 4.2 MMOL/L — SIGNIFICANT CHANGE UP (ref 3.5–5.3)
POTASSIUM UR-SCNC: 48.4 MMOL/L — SIGNIFICANT CHANGE UP
PROCALCITONIN SERPL-MCNC: 0.09 NG/ML — SIGNIFICANT CHANGE UP (ref 0.02–0.1)
PROT ?TM UR-MCNC: 4 MG/DL — SIGNIFICANT CHANGE UP
PROT SERPL-MCNC: 6.1 G/DL — SIGNIFICANT CHANGE UP (ref 6–8.3)
PROT/CREAT UR-RTO: 0.1 RATIO — SIGNIFICANT CHANGE UP (ref 0–0.2)
PROTHROM AB SERPL-ACNC: 12.9 SEC — SIGNIFICANT CHANGE UP (ref 10.5–13.4)
RBC # BLD: 2.45 M/UL — LOW (ref 3.8–5.2)
RBC # BLD: 2.51 M/UL — LOW (ref 3.8–5.2)
RBC # BLD: 2.61 M/UL — LOW (ref 3.8–5.2)
RBC # FLD: 14.6 % — HIGH (ref 10.3–14.5)
RBC # FLD: 14.8 % — HIGH (ref 10.3–14.5)
RBC # FLD: 15.7 % — HIGH (ref 10.3–14.5)
RBC BLD AUTO: ABNORMAL
RH IG SCN BLD-IMP: POSITIVE — SIGNIFICANT CHANGE UP
RH IG SCN BLD-IMP: POSITIVE — SIGNIFICANT CHANGE UP
RSV RNA NPH QL NAA+NON-PROBE: SIGNIFICANT CHANGE UP
SAO2 % BLDV: 35.3 % — LOW (ref 67–88)
SARS-COV-2 RNA SPEC QL NAA+PROBE: SIGNIFICANT CHANGE UP
SODIUM SERPL-SCNC: 135 MMOL/L — SIGNIFICANT CHANGE UP (ref 135–145)
SODIUM UR-SCNC: 34 MMOL/L — SIGNIFICANT CHANGE UP
UUN UR-MCNC: 921.4 MG/DL — SIGNIFICANT CHANGE UP
VARIANT LYMPHS # BLD: 0.9 % — SIGNIFICANT CHANGE UP (ref 0–6)
WBC # BLD: 17.95 K/UL — HIGH (ref 3.8–10.5)
WBC # BLD: 20.73 K/UL — HIGH (ref 3.8–10.5)
WBC # BLD: 26.5 K/UL — HIGH (ref 3.8–10.5)
WBC # FLD AUTO: 17.95 K/UL — HIGH (ref 3.8–10.5)
WBC # FLD AUTO: 20.73 K/UL — HIGH (ref 3.8–10.5)
WBC # FLD AUTO: 26.5 K/UL — HIGH (ref 3.8–10.5)

## 2023-04-02 PROCEDURE — 99222 1ST HOSP IP/OBS MODERATE 55: CPT | Mod: GC

## 2023-04-02 PROCEDURE — 99223 1ST HOSP IP/OBS HIGH 75: CPT | Mod: GC

## 2023-04-02 PROCEDURE — 99221 1ST HOSP IP/OBS SF/LOW 40: CPT

## 2023-04-02 PROCEDURE — 71045 X-RAY EXAM CHEST 1 VIEW: CPT | Mod: 26

## 2023-04-02 RX ORDER — LEVOTHYROXINE SODIUM 125 MCG
1 TABLET ORAL
Refills: 0 | DISCHARGE

## 2023-04-02 RX ORDER — LEVOTHYROXINE SODIUM 125 MCG
150 TABLET ORAL DAILY
Refills: 0 | Status: DISCONTINUED | OUTPATIENT
Start: 2023-04-02 | End: 2023-04-04

## 2023-04-02 RX ORDER — PRAMIPEXOLE DIHYDROCHLORIDE 0.12 MG/1
1 TABLET ORAL
Refills: 0 | DISCHARGE

## 2023-04-02 RX ORDER — LATANOPROST 0.05 MG/ML
1 SOLUTION/ DROPS OPHTHALMIC; TOPICAL AT BEDTIME
Refills: 0 | Status: DISCONTINUED | OUTPATIENT
Start: 2023-04-02 | End: 2023-04-04

## 2023-04-02 RX ORDER — TRAZODONE HCL 50 MG
1 TABLET ORAL
Refills: 0 | DISCHARGE

## 2023-04-02 RX ORDER — GABAPENTIN 400 MG/1
1 CAPSULE ORAL
Refills: 0 | DISCHARGE

## 2023-04-02 RX ORDER — PANTOPRAZOLE SODIUM 20 MG/1
80 TABLET, DELAYED RELEASE ORAL ONCE
Refills: 0 | Status: COMPLETED | OUTPATIENT
Start: 2023-04-02 | End: 2023-04-02

## 2023-04-02 RX ORDER — SODIUM CHLORIDE 9 MG/ML
1000 INJECTION INTRAMUSCULAR; INTRAVENOUS; SUBCUTANEOUS
Refills: 0 | Status: DISCONTINUED | OUTPATIENT
Start: 2023-04-02 | End: 2023-04-03

## 2023-04-02 RX ORDER — TRIAMTERENE/HYDROCHLOROTHIAZID 75 MG-50MG
1 TABLET ORAL
Refills: 0 | DISCHARGE

## 2023-04-02 RX ORDER — PANTOPRAZOLE SODIUM 20 MG/1
40 TABLET, DELAYED RELEASE ORAL EVERY 12 HOURS
Refills: 0 | Status: DISCONTINUED | OUTPATIENT
Start: 2023-04-02 | End: 2023-04-03

## 2023-04-02 RX ORDER — ACETAMINOPHEN 500 MG
650 TABLET ORAL EVERY 6 HOURS
Refills: 0 | Status: DISCONTINUED | OUTPATIENT
Start: 2023-04-02 | End: 2023-04-04

## 2023-04-02 RX ORDER — CHOLECALCIFEROL (VITAMIN D3) 125 MCG
1000 CAPSULE ORAL DAILY
Refills: 0 | Status: DISCONTINUED | OUTPATIENT
Start: 2023-04-02 | End: 2023-04-04

## 2023-04-02 RX ORDER — PRAMIPEXOLE DIHYDROCHLORIDE 0.12 MG/1
0.75 TABLET ORAL THREE TIMES A DAY
Refills: 0 | Status: DISCONTINUED | OUTPATIENT
Start: 2023-04-02 | End: 2023-04-04

## 2023-04-02 RX ORDER — ATENOLOL 25 MG/1
1 TABLET ORAL
Refills: 0 | DISCHARGE

## 2023-04-02 RX ORDER — ATORVASTATIN CALCIUM 80 MG/1
40 TABLET, FILM COATED ORAL AT BEDTIME
Refills: 0 | Status: DISCONTINUED | OUTPATIENT
Start: 2023-04-02 | End: 2023-04-04

## 2023-04-02 RX ORDER — GABAPENTIN 400 MG/1
100 CAPSULE ORAL
Refills: 0 | Status: DISCONTINUED | OUTPATIENT
Start: 2023-04-02 | End: 2023-04-04

## 2023-04-02 RX ORDER — LATANOPROST 0.05 MG/ML
1 SOLUTION/ DROPS OPHTHALMIC; TOPICAL
Refills: 0 | DISCHARGE

## 2023-04-02 RX ADMIN — Medication 150 MICROGRAM(S): at 11:20

## 2023-04-02 RX ADMIN — GABAPENTIN 100 MILLIGRAM(S): 400 CAPSULE ORAL at 18:28

## 2023-04-02 RX ADMIN — PRAMIPEXOLE DIHYDROCHLORIDE 0.75 MILLIGRAM(S): 0.12 TABLET ORAL at 18:27

## 2023-04-02 RX ADMIN — Medication 1 TABLET(S): at 12:07

## 2023-04-02 RX ADMIN — PANTOPRAZOLE SODIUM 80 MILLIGRAM(S): 20 TABLET, DELAYED RELEASE ORAL at 02:51

## 2023-04-02 RX ADMIN — LATANOPROST 1 DROP(S): 0.05 SOLUTION/ DROPS OPHTHALMIC; TOPICAL at 22:20

## 2023-04-02 RX ADMIN — ATORVASTATIN CALCIUM 40 MILLIGRAM(S): 80 TABLET, FILM COATED ORAL at 22:24

## 2023-04-02 RX ADMIN — Medication 1000 UNIT(S): at 12:07

## 2023-04-02 RX ADMIN — SODIUM CHLORIDE 50 MILLILITER(S): 9 INJECTION INTRAMUSCULAR; INTRAVENOUS; SUBCUTANEOUS at 14:31

## 2023-04-02 RX ADMIN — PRAMIPEXOLE DIHYDROCHLORIDE 0.75 MILLIGRAM(S): 0.12 TABLET ORAL at 22:21

## 2023-04-02 RX ADMIN — PANTOPRAZOLE SODIUM 40 MILLIGRAM(S): 20 TABLET, DELAYED RELEASE ORAL at 18:27

## 2023-04-02 NOTE — ED PROVIDER NOTE - OBJECTIVE STATEMENT
88F PMH HTN, hypothyroidism, hyperlipidemia p/w rectal bleeding for the past 2 days. Has had multiple episodes of rectal bleeding, about 20x, both bright red and black. Has been taking NSAID's for the past 2 weeks for back pain. No AC/AP use. Has dizziness when ambulating. No f/c, cp/sob, abd pain, urinary symptoms

## 2023-04-02 NOTE — ED PROVIDER NOTE - PROGRESS NOTE DETAILS
Evan LARSEN (PGY-3)  endorsed to hospitlaist who is requesting surg c/s. paged surg; awaiting call back. pt ordered for prbc. email sent to gi for c/s Evan LARSEN (PGY-3)  spoke to surgery who will see pt in ED

## 2023-04-02 NOTE — H&P ADULT - NSHPSOCIALHISTORY_GEN_ALL_CORE
Former tobacco use (12.5 pack-years, quit 25y ago). No alcohol or illicit drug use. Independent in ADLs

## 2023-04-02 NOTE — ED PROVIDER NOTE - NS ED MD DISPO ISOLATION TYPES
"Patient: Terri Grant    Procedure Summary     Date: 05/03/22 [facility]    Anesthesia Start: 0801 Anesthesia Stop: 0829    Procedure: COLONOSCOPY INTO CECUM & TERMINAL ILEUM WITH COLD BIOPSY POLYPECTOMY (N/A ) Diagnosis:       Hx of colonic polyp      (Hx of colonic polyp [Z86.010])    Surgeons: Betty Lopez MD Provider: Jorge Harley MD    Anesthesia Type: MAC ASA Status: 2          Anesthesia Type: MAC    Vitals  Vitals Value Taken Time   /79 05/03/22 0848   Temp     Pulse 86 05/03/22 0848   Resp 16 05/03/22 0848   SpO2 100 % 05/03/22 0848           Post Anesthesia Care and Evaluation    Patient location during evaluation: PACU  Patient participation: complete - patient participated  Level of consciousness: awake  Pain score: 0  Pain management: adequate  Airway patency: patent  Anesthetic complications: No anesthetic complications  PONV Status: none  Cardiovascular status: acceptable  Respiratory status: acceptable  Hydration status: acceptable    Comments: /79 (BP Location: Left arm, Patient Position: Lying)   Pulse 86   Temp 36.8 °C (98.2 °F) (Oral)   Resp 16   Ht 165.1 cm (65\")   Wt 56.2 kg (124 lb)   LMP  (LMP Unknown)   SpO2 100%   BMI 20.63 kg/m²       "
None

## 2023-04-02 NOTE — H&P ADULT - NSHPREVIEWOFSYSTEMS_GEN_ALL_CORE
CONSTITUTIONAL: No weakness, fevers or chills  EYES/ENT: No visual changes;  No vertigo or throat pain   NECK: No pain or stiffness  RESPIRATORY: No cough, wheezing, hemoptysis; No shortness of breath  CARDIOVASCULAR: No chest pain or palpitations. No orthopnea, paroxysmal nocturnal dysuria, or lower extremity edema  GASTROINTESTINAL: No abdominal or epigastric pain. No nausea, vomiting, or hematemesis; No diarrhea or constipation. No melena or hematochezia.  GENITOURINARY: No dysuria, frequency or hematuria  NEUROLOGICAL: +dizziness. No numbness, weakness, tingling. No headache, no visual changes  SKIN: No itching, rashes  [x] All other systems negative  [ ] Unable to assess ROS because ________ CONSTITUTIONAL: No weakness, fevers or chills  EYES/ENT: No visual changes;  No vertigo or throat pain   NECK: No pain or stiffness  RESPIRATORY: +shortness of breath. No cough, wheezing, hemoptysis  CARDIOVASCULAR: No chest pain or palpitations. No orthopnea, paroxysmal nocturnal dysuria, or lower extremity edema  GASTROINTESTINAL: +melena/hematochezia. No abdominal or epigastric pain. No nausea, vomiting, or hematemesis; No diarrhea or constipation  GENITOURINARY: No dysuria, frequency or hematuria  NEUROLOGICAL: +dizziness/lightheadedness. No numbness, weakness, tingling. No headache, no visual changes  SKIN: No itching, rashes  [x] All other systems negative  [ ] Unable to assess ROS because ________

## 2023-04-02 NOTE — ED PROVIDER NOTE - PHYSICAL EXAMINATION
Physical Exam:  Gen: awake alert   HEENT: normal conjunctiva, oral mucosa moist  Lung: CTAB, no respiratory distress, no wheezes/rhonchi/rales B/L, speaking in full sentences  CV: RRR  Abd: soft, NT, ND, no guarding, no rigidity, no rebound tenderness  Rectal: melena with small amount of red blood  MSK: no visible deformities  Skin: Warm, well perfused, no rash, no leg swelling  ~Osmar Gordon MD (PGY-3)

## 2023-04-02 NOTE — ED PROVIDER NOTE - ATTENDING CONTRIBUTION TO CARE
I (Linda) agree with above, I performed a history and physical. Counseled roberto medical staff, physician assistant, and/or medical student on medical decision making as documented. Medical decisions and treatment interventions were made in real time during the patient encounter. Additionally and/or with the following exceptions: Patient is an 88-year-old female past medical history of hypertension, hypothyroid, hyperlipidemia who is presenting in melena and bright red blood per rectum.  Has been taking more NSAIDs than usual due to her sciatica back pain.  Digital rectal examination did have melena on exam.  Patient was otherwise well-appearing.  Abdomen soft.  Hemoglobin was 8.0.  Given ongoing melena patient was transfused.  CMP significant for elevated creatinine of 1.61 and elevated BUN suggestive of upper GI bleed.  Lactic acid 1.9 indicating good peripheral perfusion.  Chest x-ray eventually was read as patchy left lower lobe opacity, however patient did not have any respiratory symptoms and was afebrile.  Patient was admitted to the hospital for GI bleed.  GI was consulted.  The patient's condition was not amenable to outpatient treatment due either the lack of feasibility of outpatient care coordination, possibility for further decompensation with adverse outcome if discharge, or treatments and diagnostic  modalities only available during an inpatient hospitalization.  Additional time as above spent by myself, separate from billable procedures, included coordination of patient care with consultants/admitting team, performing reassessments on the patient, documentation, and counseling patient/family members on the care provided.

## 2023-04-02 NOTE — H&P ADULT - NSHPPHYSICALEXAM_GEN_ALL_CORE
VITALS:   Vital Signs Last 24 Hrs  T(C): 36.5 (02 Apr 2023 07:16), Max: 36.7 (01 Apr 2023 23:25)  T(F): 97.7 (02 Apr 2023 07:16), Max: 98.1 (01 Apr 2023 23:25)  HR: 96 (02 Apr 2023 07:16) (88 - 98)  BP: 132/63 (02 Apr 2023 07:16) (103/61 - 139/67)  BP(mean): --  RR: 18 (02 Apr 2023 07:16) (16 - 18)  SpO2: 96% (02 Apr 2023 07:16) (96% - 100%)    Parameters below as of 02 Apr 2023 07:16  Patient On (Oxygen Delivery Method): room air    GENERAL: NAD, lying in bed comfortably  HEENT: no scleral icterus  NECK: Supple, no JVD  HEART: S1, S2, regular rate and rhythm, no murmurs, rubs, or gallops  LUNGS: Unlabored respirations.  Clear to auscultation bilaterally, no crackles, wheezing, or rhonchi  ABDOMEN: Soft, nontender, nondistended, +BS  RECTAL:   EXTREMITIES: 2+ peripheral pulses bilaterally. No clubbing, cyanosis, or edema  NERVOUS SYSTEM:  A&Ox3, no focal deficits   SKIN: No rashes or lesions VITALS:   Vital Signs Last 24 Hrs  T(C): 36.5 (02 Apr 2023 07:16), Max: 36.7 (01 Apr 2023 23:25)  T(F): 97.7 (02 Apr 2023 07:16), Max: 98.1 (01 Apr 2023 23:25)  HR: 96 (02 Apr 2023 07:16) (88 - 98)  BP: 132/63 (02 Apr 2023 07:16) (103/61 - 139/67)  BP(mean): --  RR: 18 (02 Apr 2023 07:16) (16 - 18)  SpO2: 96% (02 Apr 2023 07:16) (96% - 100%)    Parameters below as of 02 Apr 2023 07:16  Patient On (Oxygen Delivery Method): room air    GENERAL: NAD, lying in bed comfortably  HEENT: no scleral icterus  HEART: S1, S2, regular rate and rhythm, no murmurs, rubs, or gallops  LUNGS: Unlabored respirations.  Clear to auscultation bilaterally, no crackles, wheezing, or rhonchi  ABDOMEN: Soft, nontender, nondistended, +BS  RECTAL: deferred but per ED note- "melena w/ small amount of red blood"  EXTREMITIES: 2+ peripheral pulses bilaterally. No clubbing, cyanosis, or edema  NERVOUS SYSTEM:  A&Ox3, no focal deficits   SKIN: No rashes or lesions VITALS:   Vital Signs Last 24 Hrs  T(C): 36.5 (02 Apr 2023 07:16), Max: 36.7 (01 Apr 2023 23:25)  T(F): 97.7 (02 Apr 2023 07:16), Max: 98.1 (01 Apr 2023 23:25)  HR: 96 (02 Apr 2023 07:16) (88 - 98)  BP: 132/63 (02 Apr 2023 07:16) (103/61 - 139/67)  BP(mean): --  RR: 18 (02 Apr 2023 07:16) (16 - 18)  SpO2: 96% (02 Apr 2023 07:16) (96% - 100%)    Parameters below as of 02 Apr 2023 07:16  Patient On (Oxygen Delivery Method): room air    GENERAL: NAD, lying in bed comfortably  HEENT: no scleral icterus  HEART: S1, S2, regular rate and rhythm, +3/6 systolic murmur best heard at the upper sternal borders  LUNGS: Unlabored respirations.  Clear to auscultation bilaterally, no crackles, wheezing, or rhonchi  ABDOMEN: Soft, nontender, nondistended, +BS  RECTAL: deferred but per ED note- "melena w/ small amount of red blood"  EXTREMITIES: 2+ peripheral pulses bilaterally. No clubbing, cyanosis, or edema  NERVOUS SYSTEM:  A&Ox3, +resting tremor in LUE  SKIN: No rashes or lesions

## 2023-04-02 NOTE — ED ADULT NURSE REASSESSMENT NOTE - NS ED NURSE REASSESS COMMENT FT1
Pt. tolerating transfusion well. No signs of transfusion rxn. VSS as documented in flowsheet. Resp. equal and unlabored b/l. VSS. NAD. Comfort measures provided, safety measures implemented, call bell in reach.
Pt. educated on potential s/s of transfusion rxn. Pt. displays understanding. VSS as documented in flowsheet. Consent signed w/ MD and in chart. 2 nurse verification performed. Transfusion initiated.

## 2023-04-02 NOTE — CONSULT NOTE ADULT - SUBJECTIVE AND OBJECTIVE BOX
HPI:  87 yo F w/ HTN, HLD, hypothyroidism, Parkinson's disease, sciatica who presents w/ melena x 2d. Patient states her bleeding began on Friday with multiple episodes of mixed bright red/dark stools. She states her bleeding stopped Saturday morning and started again Saturday night. Patient states she has been taking multiple doses of Advil and Motrin daily for her sciatics over the past week. She reports associated shortness of breath and lightheadedness/dizziness w/ ambulation. Pt. denies previous episodes of rectal/GI bleeding, personal/family hx of colon cancer, antiplatelet/anticoagulant use, nausea, vomiting, diarrhea, constipation, abdominal pain, hematemesis, weight loss. She reports having an EGD in the past but cannot recall the results; she has never had a screening colonoscopy.   Hgb 8 in ED.    Allergies:  penicillin (Pruritus; Rash)  sulfa drugs (Vomiting)    Home Medications:  atenolol 50 mg oral tablet: 1 orally once a day (2023 09:28)  atorvastatin 40 mg oral tablet: 1 tab(s) orally once a day (2023 09:36)  latanoprost 0.005% ophthalmic solution: 1 drop(s) in each eye once a day (2023 09:34)  levothyroxine 150 mcg (0.15 mg) oral capsule: 1 orally once a day in the morning with breakfast (2023 09:30)  losartan 100 mg oral tablet: 1 tab(s) orally once a day (2023 09:36)  Multiple Vitamins oral tablet: 1 tab(s) orally once a day (2023 09:36)  Neurontin 100 mg oral capsule: 1 cap(s) orally 2 times a day (2023 09:34)  pramipexole 0.75 mg oral tablet: 1 tab(s) orally 3 times a day (2023 10:31)  traZODone 50 mg oral tablet: 1 tab(s) orally once a day (at bedtime) (2023 09:35)  triamterene-hydrochlorothiazide 37.5 mg-25 mg oral capsule: 1 cap(s) orally once a day (2023 10:31)  Vitamin D3 1000 intl units oral capsule: 1 cap(s) orally once a day (2023 09:36)    Hospital Medications:  acetaminophen     Tablet .. 650 milliGRAM(s) Oral every 6 hours PRN  atorvastatin 40 milliGRAM(s) Oral at bedtime  cholecalciferol 1000 Unit(s) Oral daily  gabapentin 100 milliGRAM(s) Oral two times a day  latanoprost 0.005% Ophthalmic Solution 1 Drop(s) Both EYES at bedtime  levothyroxine 150 MICROGram(s) Oral daily  multivitamin 1 Tablet(s) Oral daily  pantoprazole  Injectable 40 milliGRAM(s) IV Push every 12 hours  pramipexole 0.75 milliGRAM(s) Oral three times a day  sodium chloride 0.9%. 1000 milliLiter(s) IV Continuous <Continuous>      PMHX/PSHX:  Hypertension    Thyroid disease    High cholesterol    Parkinson disease    Hypothyroidism    HLD (hyperlipidemia)    Sciatica    S/p nephrectomy    S/P     S/P tonsillectomy    S/P knee replacement, bilateral    Family history:  Family history of lung cancer (Mother)    Denies family history of colon cancer/polyps, stomach cancer/polyps, pancreatic cancer/masses, liver cancer/disease, ovarian cancer and endometrial cancer.    Social History:   Tob: Denies  EtOH: Denies  Illicit Drugs: Denies    ROS:   General:  No wt loss, fevers, chills, night sweats, fatigue  Eyes:  Good vision, no reported pain  ENT:  No sore throat, pain, runny nose, dysphagia  CV:  No pain, palpitations, hypo/hypertension  Pulm:  No dyspnea, cough, tachypnea, wheezing  GI:  see HPI  :  No pain, bleeding, incontinence, nocturia  Muscle:  No pain, weakness  Neuro:  No weakness, tingling, memory problems  Psych:  No fatigue, insomnia, mood problems, depression  Endocrine:  No polyuria, polydipsia, cold/heat intolerance  Heme:  No petechiae, ecchymosis, easy bruisability  Skin:  No rash, tattoos, scars, edema    PHYSICAL EXAM:   GENERAL:  No acute distress, pale appearing  HEENT:  NCAT, no scleral icterus   CHEST:  no respiratory distress  HEART:  Regular rate and rhythm  ABDOMEN:  Soft, non-tender, non-distended   EXTREMITIES: No edema  SKIN:  No rash/erythema/ecchymoses   NEURO:  Alert and oriented x 3     Vital Signs:  Vital Signs Last 24 Hrs  T(C): 36.9 (2023 13:02), Max: 36.9 (2023 13:02)  T(F): 98.4 (2023 13:02), Max: 98.4 (2023 13:02)  HR: 92 (2023 13:02) (88 - 98)  BP: 128/55 (2023 13:02) (103/61 - 139/67)  BP(mean): --  RR: 17 (2023 13:02) (16 - 18)  SpO2: 100% (2023 13:02) (96% - 100%)    Parameters below as of 2023 13:02  Patient On (Oxygen Delivery Method): room air      Daily Height in cm: 154.17 (2023 07:16)    Daily     LABS:                        8.2    20.73 )-----------( 367      ( 2023 10:00 )             24.8     Mean Cell Volume: 95.0 fL (- @ 10:00)        135  |  101  |  86<H>  ----------------------------<  130<H>  4.2   |  20<L>  |  1.61<H>    Ca    10.1      2023 02:59    TPro  6.1  /  Alb  3.8  /  TBili  0.3  /  DBili  x   /  AST  23  /  ALT  15  /  AlkPhos  58  04-02    LIVER FUNCTIONS - ( 2023 02:59 )  Alb: 3.8 g/dL / Pro: 6.1 g/dL / ALK PHOS: 58 U/L / ALT: 15 U/L / AST: 23 U/L / GGT: x           PT/INR - ( 2023 02:48 )   PT: 12.9 sec;   INR: 1.11 ratio         PTT - ( 2023 02:48 )  PTT:23.3 sec    Amylase Serum--      Lipase serum41       Ammonia--                          8.2    20.73 )-----------( 367      ( 2023 10:00 )             24.8                         8.0    26.50 )-----------( 467      ( 2023 02:45 )             24.8       Imaging:  EXAM:  CT ABDOMEN AND PELVIS                        PROCEDURE DATE:  2021    INTERPRETATION:  CLINICAL INFORMATION: Bandemia and diarrhea.    COMPARISON: None.    CONTRAST/COMPLICATIONS:  IV Contrast: None  Oral Contrast: None  Complications: None    PROCEDURE:  CT of the Abdomen and Pelvis was performed.  Sagittal and coronal reformats were performed.    FINDINGS:  LOWER CHEST: Right lower lobe groundglass opacities. Left lower lobe areas of scarring/large atelectasis. Hiatalhernia with intrathoracic stomach.    LIVER: Within normal limits.  BILE DUCTS: Normal caliber.  GALLBLADDER: Within normal limits.  SPLEEN: Within normal limits.  PANCREAS: Within normal limits.  ADRENALS: Right adrenal gland within normal limits. Left adrenal gland not visualized.  KIDNEYS/URETERS: Status post left nephrectomy with compensatory hypertrophy of the right kidney. No hydronephrosis.    BLADDER: Within normal limits.  REPRODUCTIVE ORGANS: Uterus and adnexa within normal limits.    BOWEL: No bowel obstruction. Diverticulosis without acute diverticulitis. Sigmoid and rectal bowel wall thickening. Appendix is normal.  PERITONEUM: No ascites.  VESSELS: Atherosclerotic changes.  RETROPERITONEUM/LYMPH NODES: No lymphadenopathy.  ABDOMINAL WALL: Intramuscular lipoma in the right vastus intermedius muscle.  BONES: Degenerative changes.    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    ROBIN KATE MD; Resident Radiology  This document has been electronically signed.  WHIT GARCIA MD; Attending Radiologist  This document has been electronically signed. May  8 2021 11:53PM             HPI:  87 yo F w/ HTN, HLD, hypothyroidism, Parkinson's disease, sciatica who presents w/ melena x 2d. Patient states her bleeding began on Friday with multiple episodes of mixed bright red/dark stools. She states her bleeding stopped Saturday morning and started again Saturday night. Patient states she has been taking multiple doses of Advil and Motrin daily for her sciatics over the past week. She reports associated shortness of breath and lightheadedness/dizziness w/ ambulation. Pt. denies previous episodes of rectal/GI bleeding, personal/family hx of colon cancer, antiplatelet/anticoagulant use, nausea, vomiting, diarrhea, constipation, abdominal pain, hematemesis, weight loss. She reports having an EGD in the past but cannot recall the results; she has never had a screening colonoscopy.   Hgb 8 in ED.    Allergies:  penicillin (Pruritus; Rash)  sulfa drugs (Vomiting)    Home Medications:  atenolol 50 mg oral tablet: 1 orally once a day (2023 09:28)  atorvastatin 40 mg oral tablet: 1 tab(s) orally once a day (2023 09:36)  latanoprost 0.005% ophthalmic solution: 1 drop(s) in each eye once a day (2023 09:34)  levothyroxine 150 mcg (0.15 mg) oral capsule: 1 orally once a day in the morning with breakfast (2023 09:30)  losartan 100 mg oral tablet: 1 tab(s) orally once a day (2023 09:36)  Multiple Vitamins oral tablet: 1 tab(s) orally once a day (2023 09:36)  Neurontin 100 mg oral capsule: 1 cap(s) orally 2 times a day (2023 09:34)  pramipexole 0.75 mg oral tablet: 1 tab(s) orally 3 times a day (2023 10:31)  traZODone 50 mg oral tablet: 1 tab(s) orally once a day (at bedtime) (2023 09:35)  triamterene-hydrochlorothiazide 37.5 mg-25 mg oral capsule: 1 cap(s) orally once a day (2023 10:31)  Vitamin D3 1000 intl units oral capsule: 1 cap(s) orally once a day (2023 09:36)    Hospital Medications:  acetaminophen     Tablet .. 650 milliGRAM(s) Oral every 6 hours PRN  atorvastatin 40 milliGRAM(s) Oral at bedtime  cholecalciferol 1000 Unit(s) Oral daily  gabapentin 100 milliGRAM(s) Oral two times a day  latanoprost 0.005% Ophthalmic Solution 1 Drop(s) Both EYES at bedtime  levothyroxine 150 MICROGram(s) Oral daily  multivitamin 1 Tablet(s) Oral daily  pantoprazole  Injectable 40 milliGRAM(s) IV Push every 12 hours  pramipexole 0.75 milliGRAM(s) Oral three times a day  sodium chloride 0.9%. 1000 milliLiter(s) IV Continuous <Continuous>      PMHX/PSHX:  Hypertension    Thyroid disease    High cholesterol    Parkinson disease    Hypothyroidism    HLD (hyperlipidemia)    Sciatica    S/p nephrectomy    S/P     S/P tonsillectomy    S/P knee replacement, bilateral    Family history:  Family history of lung cancer (Mother)    Denies family history of colon cancer/polyps, stomach cancer/polyps, pancreatic cancer/masses, liver cancer/disease, ovarian cancer and endometrial cancer.    Social History:   Tob: Denies  EtOH: Denies  Illicit Drugs: Denies    ROS:   General:  No wt loss, fevers, chills, night sweats, fatigue  Eyes:  Good vision, no reported pain  ENT:  No sore throat, pain, runny nose, dysphagia  CV:  No pain, palpitations, hypo/hypertension  Pulm:  No dyspnea, cough, tachypnea, wheezing  GI:  see HPI  :  No pain, bleeding, incontinence, nocturia  Muscle:  No pain, weakness  Neuro:  No weakness, tingling, memory problems  Psych:  No fatigue, insomnia, mood problems, depression  Endocrine:  No polyuria, polydipsia, cold/heat intolerance  Heme:  No petechiae, ecchymosis, easy bruisability  Skin:  No rash, tattoos, scars, edema    PHYSICAL EXAM:   GENERAL:  No acute distress, pale appearing  HEENT:  NCAT, no scleral icterus   CHEST:  no respiratory distress  HEART:  Regular rate and rhythm  ABDOMEN:  Soft, non-tender, non-distended   EXTREMITIES: No edema  SKIN:  No rash/erythema/ecchymoses   NEURO:  Alert and oriented x 3   RECTAL: +melena    Vital Signs:  Vital Signs Last 24 Hrs  T(C): 36.9 (2023 13:02), Max: 36.9 (2023 13:02)  T(F): 98.4 (2023 13:02), Max: 98.4 (2023 13:02)  HR: 92 (2023 13:02) (88 - 98)  BP: 128/55 (2023 13:02) (103/61 - 139/67)  BP(mean): --  RR: 17 (2023 13:02) (16 - 18)  SpO2: 100% (2023 13:02) (96% - 100%)    Parameters below as of 2023 13:02  Patient On (Oxygen Delivery Method): room air      Daily Height in cm: 154.17 (2023 07:16)    Daily     LABS:                        8.2    20.73 )-----------( 367      ( 2023 10:00 )             24.8     Mean Cell Volume: 95.0 fL (- @ 10:00)        135  |  101  |  86<H>  ----------------------------<  130<H>  4.2   |  20<L>  |  1.61<H>    Ca    10.1      2023 02:59    TPro  6.1  /  Alb  3.8  /  TBili  0.3  /  DBili  x   /  AST  23  /  ALT  15  /  AlkPhos  58  04-02    LIVER FUNCTIONS - ( 2023 02:59 )  Alb: 3.8 g/dL / Pro: 6.1 g/dL / ALK PHOS: 58 U/L / ALT: 15 U/L / AST: 23 U/L / GGT: x           PT/INR - ( 2023 02:48 )   PT: 12.9 sec;   INR: 1.11 ratio         PTT - ( 2023 02:48 )  PTT:23.3 sec    Amylase Serum--      Lipase serum41       Ammonia--                          8.2    20.73 )-----------( 367      ( 2023 10:00 )             24.8                         8.0    26.50 )-----------( 467      ( 2023 02:45 )             24.8       Imaging:  EXAM:  CT ABDOMEN AND PELVIS                        PROCEDURE DATE:  2021    INTERPRETATION:  CLINICAL INFORMATION: Bandemia and diarrhea.    COMPARISON: None.    CONTRAST/COMPLICATIONS:  IV Contrast: None  Oral Contrast: None  Complications: None    PROCEDURE:  CT of the Abdomen and Pelvis was performed.  Sagittal and coronal reformats were performed.    FINDINGS:  LOWER CHEST: Right lower lobe groundglass opacities. Left lower lobe areas of scarring/large atelectasis. Hiatalhernia with intrathoracic stomach.    LIVER: Within normal limits.  BILE DUCTS: Normal caliber.  GALLBLADDER: Within normal limits.  SPLEEN: Within normal limits.  PANCREAS: Within normal limits.  ADRENALS: Right adrenal gland within normal limits. Left adrenal gland not visualized.  KIDNEYS/URETERS: Status post left nephrectomy with compensatory hypertrophy of the right kidney. No hydronephrosis.    BLADDER: Within normal limits.  REPRODUCTIVE ORGANS: Uterus and adnexa within normal limits.    BOWEL: No bowel obstruction. Diverticulosis without acute diverticulitis. Sigmoid and rectal bowel wall thickening. Appendix is normal.  PERITONEUM: No ascites.  VESSELS: Atherosclerotic changes.  RETROPERITONEUM/LYMPH NODES: No lymphadenopathy.  ABDOMINAL WALL: Intramuscular lipoma in the right vastus intermedius muscle.  BONES: Degenerative changes.    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    ROBIN KATE MD; Resident Radiology  This document has been electronically signed.  WHIT GARCIA MD; Attending Radiologist  This document has been electronically signed. May  8 2021 11:53PM

## 2023-04-02 NOTE — ED PROVIDER NOTE - CLINICAL SUMMARY MEDICAL DECISION MAKING FREE TEXT BOX
88F PMH HTN, hypothyroidism, hyperlipidemia p/w rectal bleeding for the past 2 days in setting of nsaid use. VS and exam as above  Likely bleeding ulcer for nsaid use. Lower concern for brisk UGIB or LGIB at this time. Will give protonix, transfuse as needed, gi c/s, abdominal labs, reassess symptoms. Pt currently HDS so no need for emergent blood currently

## 2023-04-02 NOTE — ED PROVIDER NOTE - NS ED ROS FT
CONST: no fevers, no chills  ENT: no sore throat  CV: no chest pain, no leg swelling  RESP: no shortness of breath, no cough  ABD: no abdominal pain, +rectal bleeding  : no dysuria, no flank pain, no hematuria  MSK: no back pain, no extremity pain  SKIN:  no rash

## 2023-04-02 NOTE — H&P ADULT - PROBLEM SELECTOR PLAN 9
DVT PPx: hold chemical VTE ppx at this time given active GI bleeding  Diet: NPO  Dispo: pending clinical improvement  Code Status: full code DVT PPx: hold chemical VTE ppx at this time given active GI bleeding  Diet: DASH/TLC; NPO after midnight for EGD 4/3  Dispo: pending clinical improvement  Code Status: full code

## 2023-04-02 NOTE — PATIENT PROFILE ADULT - FALL HARM RISK - HARM RISK INTERVENTIONS

## 2023-04-02 NOTE — H&P ADULT - NSHPLABSRESULTS_GEN_ALL_CORE
LABS:                        8.0    26.50 )-----------( 467      ( 02 Apr 2023 02:45 )             24.8     04-02    135  |  101  |  86<H>  ----------------------------<  130<H>  4.2   |  20<L>  |  1.61<H>    Ca    10.1      02 Apr 2023 02:59    TPro  6.1  /  Alb  3.8  /  TBili  0.3  /  DBili  x   /  AST  23  /  ALT  15  /  AlkPhos  58  04-02    IMAGING:  < from: Xray Chest 1 View AP/PA (04.02.23 @ 06:38) >    INTERPRETATION:  EXAMINATION: XR CHEST    CLINICAL INDICATION: Leucocytosis    TECHNIQUE: Single frontal, portable view of the chest was obtained.    COMPARISON: Chest x-ray 3/6/2021.    FINDINGS:  The heart is normal in size.  Left lower lobe patchy opacity.  There is no pneumothorax or pleural effusion.    IMPRESSION:  Lower lobe patchy opacity.

## 2023-04-02 NOTE — H&P ADULT - PROBLEM SELECTOR PLAN 4
Hx of HTN on atenolol 50mg qd, losartan 100mg qd, Dyazide 37.5mg-25mg qd at home. Was also prescribed furosemide 40mg BID for leg edema, but pt. is unsure of her medications  - will call pt's pharmacy to obtain accurate med rec  - SBPs on admission 100s-130s  - hold antihypertensives given active bleeding/soft BPs  - monitor SBPs Hx of HTN on atenolol 50mg qd, losartan 100mg qd, Dyazide 37.5mg-25mg qd at home. Was also prescribed furosemide 40mg BID for leg edema, but pt no longer taking  - SBPs on admission 100s-130s  - hold antihypertensives given active bleeding/soft BPs  - monitor SBPs

## 2023-04-02 NOTE — CONSULT NOTE ADULT - ATTENDING COMMENTS
Chief complaint:  melena    The active issues are:  1. GIB, likely UGIB due to NSAID gastritis    agree with recommendations above    [facility]:    urgent issues - spectra [id number]  nonurgent issues - [phone]  patient appointments or afterhours - [phone]
Agree with above. Pt presents with melena in the setting of heavy Alleve use, multiple doses a day for sciatica pain. Suspect PUD/UGI bleeding. Start PPI. Keep NPO after midnight for EGD tomorrow.

## 2023-04-02 NOTE — H&P ADULT - PROBLEM SELECTOR PLAN 2
SCr 1.61 on admission (baseline SCr 1.11), possibly pre-renal OLIVER 2/2 renal hypoperfusion from GI bleeding vs. ATN   - f/u urine studies  - avoid nephrotoxins, renally dose medications as per GFR  - monitor I/Os, UOP, SCr SCr 1.61 on admission (baseline SCr 1.11), possibly pre-renal OLIVER 2/2 renal hypoperfusion from GI bleeding vs. ATN   - FENa 0.7%, c/w pre-renal OLIVER  - avoid nephrotoxins, renally dose medications as per GFR  - monitor I/Os, UOP, SCr

## 2023-04-02 NOTE — ED ADULT NURSE NOTE - OBJECTIVE STATEMENT
Rcvd pt to rm 5: A&Ox4 and amb w/ walker. Endorsing multiple episodes bloody stools. Takes ASA daily. States watery, dark red bowel movements that began yesterday. SOB on exertion. Mild dizziness. Generalized fatigue and weakness. Denies CP. Placed on cardiac monitor- sinus tach. Resp. equal and unlabored b/l. VSS. NAD. Comfort measures provided, safety measures implemented, call bell in reach. MD at bedside.

## 2023-04-02 NOTE — H&P ADULT - PROBLEM SELECTOR PLAN 7
Hx of sciatica, was taking multiple doses of NSAIDs and Tylenol at home due to incomplete relief  - avoid NSAIDs given GI bleeding  - Tylenol PRN for pain Hx of sciatica, was taking multiple doses of NSAIDs and Tylenol at home due to incomplete relief  - avoid NSAIDs given GI bleeding  - c/w gabapentin 100mg BID  - Tylenol PRN for pain

## 2023-04-02 NOTE — H&P ADULT - PROBLEM SELECTOR PLAN 6
Hx of hypothyroidism on levothyroxine 150mcg qd at home  - f/u TSH in AM (11.2 in 2021)  - c/w levothyroxine 150mcg qd

## 2023-04-02 NOTE — H&P ADULT - HISTORY OF PRESENT ILLNESS
88F w/ HTN, HLD, hypothyroidism who presents w/ rectal bleeding x 2d. Patient reports having multiple episodes of rectal bleeding, roughly 20x episodes in total. The bleeding is a mix of bright red and dark red blood. Pt. reports taking NSAIDs for the past 2 weeks for lower back pain but denies any anticoagulant/antiplatelet use.     Colonoscopy/Endoscopy:  Personal Hx of Colon Ca:  Fam Hx of Colon Ca: 88F w/ HTN, HLD, hypothyroidism, Parkinson's disease, sciatica who presents w/ rectal bleeding x 2d. Patient states her bleeding began on Friday with multiple episodes of mixed bright red/dark stools. She states he bleeding stopped Saturday morning and began again; she reports having 20x total episodes of bleeding. Pt. states she has sciatica for which she has been taking large amounts of NSAIDs and Tylenol for pain relief; pt. cannot quantify how much she was taking. She reports associated shortness of breath and lightheadedness/dizziness w/ ambulation that was more prominent early this morning when she came into the ED. Pt. denies previous episodes of rectal/GI bleeding, personal/family hx of colon cancer, antiplatelet/anticoagulant use, nausea, vomiting, diarrhea, constipation, abdominal pain, hematemesis, weight loss. She reports having an EGD in the past but cannot recall the results; she has never had a colonoscopy.     In the ED, initial vitals: afebrile, HR 88, /61, SpO2 100% RA. Labs notable for Hgb/Hct of 8/24.8, WBC 26.5k, SCr 1.61 (baseline 1.1). CXR showing left lower lobe patchy opacity. She received 1U PRBC for symptomatic anemia and was evaluated by surgery and GI.  88F w/ HTN, HLD, hypothyroidism, Parkinson's disease, sciatica who presents w/ rectal bleeding x 2d. Patient states her bleeding began on Friday with multiple episodes of mixed bright red/dark stools. She states her bleeding stopped Saturday morning and began again; she reports having 20x total episodes of bleeding. Pt. states she has sciatica for which she has been taking large amounts of NSAIDs and Tylenol for pain relief; pt. cannot quantify how much she was taking. She reports associated shortness of breath and lightheadedness/dizziness w/ ambulation that was more prominent early this morning when she came into the ED. Pt. denies previous episodes of rectal/GI bleeding, personal/family hx of colon cancer, antiplatelet/anticoagulant use, nausea, vomiting, diarrhea, constipation, abdominal pain, hematemesis, weight loss. She reports having an EGD in the past but cannot recall the results; she has never had a screening colonoscopy.     In the ED, initial vitals: afebrile, HR 88, /61, SpO2 100% RA. Labs notable for Hgb/Hct of 8/24.8, WBC 26.5k, SCr 1.61 (baseline 1.1). CXR showing left lower lobe patchy opacity. She received 1U PRBC for symptomatic anemia and was evaluated by surgery and GI.

## 2023-04-02 NOTE — H&P ADULT - NSICDXPASTMEDICALHX_GEN_ALL_CORE_FT
PAST MEDICAL HISTORY:  High cholesterol     Hypertension     Thyroid disease      PAST MEDICAL HISTORY:  HLD (hyperlipidemia)     Hypertension     Hypothyroidism     Parkinson disease     Sciatica

## 2023-04-02 NOTE — H&P ADULT - PROBLEM SELECTOR PLAN 1
Pt. p/w multiple episodes of mixed melena/bright red rectal bleeding w/ associated symptomatic anemia (SOB, dizziness/lightheadedness w/ ambulation). Suspect UGIB 2/2 gastric ulcers from recent heavy NSAID use. Low c/f brisk UGIB or LGIB  - s/p IV pantoprazole 80mg and 1U PRBC in ED  - surgery consulted; appreciate recs- no acute intervention  - GI consulted for possible EGD/colonoscopy; appreciate recs  - f/u post-transfusion CBC  - c/w IV pantoprazole 40 mg BID  - keep pt. NPO for now   - maintain 2 large bore IVs, active type and screen  - monitor CBC and transfuse for Hgb <7 Pt. p/w multiple episodes of mixed melena/bright red rectal bleeding associated w/ symptomatic anemia (SOB, dizziness/lightheadedness w/ ambulation). Suspect UGIB 2/2 gastric ulcers from recent heavy NSAID use. Low c/f brisk UGIB or LGIB  - s/p IV pantoprazole 80mg and 1U PRBC in ED  - surgery consulted; appreciate recs- no acute intervention  - GI consulted for possible EGD/colonoscopy; appreciate recs  - f/u post-transfusion CBC  - c/w IV pantoprazole 40 mg BID  - keep pt. NPO for now   - maintain 2 large bore IVs, active type and screen  - monitor CBC and transfuse for Hgb <7 Pt. p/w multiple episodes of mixed melena/bright red rectal bleeding associated w/ symptomatic anemia (SOB, dizziness/lightheadedness w/ ambulation). Suspect UGIB 2/2 gastric ulcers from recent heavy NSAID use. Low c/f brisk UGIB or LGIB  - s/p IV pantoprazole 80mg and 1U PRBC in ED w/ repeat Hgb 8.2  - surgery consulted; appreciate recs- no acute intervention  - GI consulted for possible EGD/colonoscopy; appreciate recs  - f/u post-transfusion CBC  - c/w IV pantoprazole 40 mg BID  - keep pt. NPO for now   - maintain 2 large bore IVs, active type and screen  - monitor CBC and transfuse for Hgb <7

## 2023-04-02 NOTE — H&P ADULT - ASSESSMENT
88F w/ HTN, HLD, hypothyroidism, Parkinson's disease, sciatica who presents w/ multiple episodes of mixed melena/bright red rectal bleeding after NSAID use, found to have symptomatic anemia s/p 1U PRBC, currently admitted for GI bleeding pending upper endoscopy +/- colonoscopy with GI. Pt. also with OLIVER and left lower lobe consolidation, currently stable.

## 2023-04-02 NOTE — CONSULT NOTE ADULT - ASSESSMENT
88F PMH HTN, hypothyroidism, hyperlipidemia p/w rectal bleeding for the past 2 days p/w multiple episodes of dark red rectal bleeding, about 20x, both all dark read/purple, likely upper GI. Has been taking overdose NSAID's for the past 2 weeks for back pain. No AC/AP use. Has dizziness when ambulating. No f/c, cp/sob, abd pain, urinary symptoms. Never had colonoscopy.     Plan:  - no acute surgical intervention  - suggest PPI  - GI consult for EGD and colonoscopy  - care per primary team  - please recall for any other questions  - plan discussed with Dr. Josh Alfred, [place] PGY-2  B team Surgery  [id number]   
89 yo F w/ HTN, HLD, hypothyroidism, Parkinson's disease, sciatica who presents w/ melena x 2d in the setting of 1 week of heavy NSAID use.    #Melena, c/f UGIB in setting of recent NSAID use. Ddx PUD vs gastritis vs mass vs Dieulafoy vs other. Not on AC  #Anemia 2/2 likely UGIB, baseline around 12 and 8 on this admission  #Sciatica, takes NSAIDs at home    Recommendations  - trend CBC, keep active t&S, transfuse for Hgb<8  - protonix gtt  - plan for EGD tomorrow  - keep NPO after MN  - regular diet today    GI will continue to follow.     All recommendations are tentative until note is attested by attending.     Virginia Perez, PGY5  Gastroenterology/Hepatology Fellow  Available on Microsoft Teams  [phone] ([facility] Short Range Pager)  [phone] (Long Range Pager)    After 5pm, please contact the on-call GI fellow. [phone]

## 2023-04-02 NOTE — CONSULT NOTE ADULT - SUBJECTIVE AND OBJECTIVE BOX
HPI:   88F PMH HTN, hypothyroidism, hyperlipidemia p/w rectal bleeding for the past 2 days. Has had multiple episodes of rectal bleeding, about 20x, both all dark rid/purple. Has been taking NSAID's for the past 2 weeks for back pain. No AC/AP use. Has dizziness when ambulating. No f/c, cp/sob, abd pain, urinary symptoms    ROS: 10-system review is otherwise negative except HPI above.      PAST MEDICAL & SURGICAL HISTORY:  Hypertension      Thyroid disease      High cholesterol      S/p nephrectomy  left (  )      S/P   x 4      S/P tonsillectomy  in childhood      S/P knee replacement, bilateral        FAMILY HISTORY:  Family history of lung cancer (Mother)      [] Family history not pertinent as reviewed with the patient and family    SOCIAL HISTORY:  nonsmoker, no alcohol use     ALLERGIES: penicillin (Pruritus; Rash)  sulfa drugs (Vomiting)      HOME MEDICATIONS:   · 	Multiple Vitamins oral tablet: 1 tab(s) orally once a day  · 	ciprofloxacin 500 mg oral tablet: 1 tab(s) orally every 24 hours  · 	metroNIDAZOLE 500 mg oral tablet: 1 tab(s) orally every 8 hours  · 	losartan 100 mg oral tablet: 1 tab(s) orally once a day  · 	levothyroxine 137 mcg (0.137 mg) oral capsule: 1 cap(s) orally once a day  · 	atorvastatin 40 mg oral tablet: 1 tab(s) orally once a day  · 	Vitamin D3 1000 intl units oral capsule: 1 cap(s) orally once a day    CURRENT MEDICATIONS  MEDICATIONS (STANDING):   MEDICATIONS (PRN):  --------------------------------------------------------------------------------------------    Vitals:   T(C): 36.5 (23 @ 05:45), Max: 36.7 (23 @ 23:25)  HR: 92 (23 @ 05:45) (88 - 98)  BP: 139/67 (23 @ 05:45) (103/61 - 139/67)  RR: 16 (23 @ 05:45) (16 - 18)  SpO2: 100% (23 @ 05:45) (100% - 100%)  CAPILLARY BLOOD GLUCOSE        CAPILLARY BLOOD GLUCOSE              PHYSICAL EXAM:   General: NAD, Lying in bed comfortably  Neuro: A+Ox3  HEENT: NC/AT, EOMI  Neck: Soft, supple  Cardio: RRR, nml S1/S2  Resp: Good effort, CTA b/l  Thorax: No chest wall tenderness  Breast: No lesions/masses, no drainage  GI/Abd: Soft, NT/ND, no rebound/guarding, no masses palpated, small external hemorrhoids no bleding   Vascular: All 4 extremities warm.  Skin: Intact, no breakdown  Musculoskeletal: All 4 extremities moving spontaneously, no limitations  --------------------------------------------------------------------------------------------    LABS  CBC ( @ 02:45)                              8.0<L>                         26.50<H>  )----------------(  467<H>     89.5<H>% Neutrophils, 6.1<L>% Lymphocytes, ANC: 24.19<H>                              24.8<L>    BMP ( @ 02:59)             135     |  101     |  86<H> 		Ca++ --      Ca 10.1               ---------------------------------( 130<H>		Mg --                 4.2     |  20<L>   |  1.61<H>			Ph --        LFTs ( @ 02:59)      TPro 6.1 / Alb 3.8 / TBili 0.3 / DBili -- / AST 23 / ALT 15 / AlkPhos 58    Coags ( @ 02:48)  aPTT 23.3<L> / INR 1.11 / PT 12.9        VBG ( @ 02:45)     7.31<L> / 42 / 26 / 21<L> / -4.8<L> / 35.3<L>%     Lactate: 1.9    --------------------------------------------------------------------------------------------    MICROBIOLOGY      --------------------------------------------------------------------------------------------    IMAGING

## 2023-04-02 NOTE — H&P ADULT - PROBLEM SELECTOR PLAN 3
CXR on admission showing LLL consolidation w/ leukocytosis to 26k. Pt. w/o symptoms including cough, fevers, chills, SOB. SpO2 >92% on room air  - RVP/COVID negative  - f/u BCx, urine legionella, urine strep, procal  - will monitor off of abx at this time as pt. does not meet sepsis criteria and is comfortable on room air  - monitor WBC count and temperature curve

## 2023-04-02 NOTE — H&P ADULT - PROBLEM SELECTOR PLAN 8
Hx of Parkinson disease; pt. takes medications but does not remember the name. Has mild resting tremor on exam  - will call pt's pharmacy to obtain accurate med rec Hx of Parkinson disease on pramipexole 0.75mg TID. Has mild resting tremor on exam  - c/w pramipexole 0.75mg TID

## 2023-04-02 NOTE — H&P ADULT - ATTENDING COMMENTS
88 year old female with Parkinson's, solitary kidney (reports removal decades ago), hypothyroidism, HTN, HLD presenting with two days of BRBPR along with dark colored stools in setting of significant NSAID use. Here, initial SBP 100s, now improved 120-130s, HR 80-90s mostly, afebrile, labs with WBC 20, Hg 8.0 -> 8.2 (after 1 unit pRBC), plt 367, Cr 1.6 (baseline 1.1), lactate 1.9. CXR LLL opacity. During encounter, asymptomatic, reported some lightheadedness when ambulating to bathroom earlier, resolved at rest, no pain. Suspected UGIB iso heavy NSAID use. Appreciate surgery and GI evaluations. C/w IV PPI, NPO after MN, plan for EGD tomorrow per GI. Clinically w/o signs of pna, will hold off on abx, repeat CXR as outpatient to re-evaluate infiltrate seen on XR here. C/w gentle IVF and trend Cr closely, suspected pre-renal OLIVER. Repeat CBC this evening, transfusion goal 7. Discussed with Dr. Rodriguez, remainder as above.

## 2023-04-02 NOTE — ED ADULT NURSE NOTE - CHIEF COMPLAINT QUOTE
patient c/o rectal bleeding since yesterday. Pt said its been off and on for 2 days.  Denies chest pain sob n/v hemorrhoids. Also complaining of weakness since bleeding. PMH parkinsons, nephrectomy.

## 2023-04-03 LAB
ANION GAP SERPL CALC-SCNC: 10 MMOL/L — SIGNIFICANT CHANGE UP (ref 7–14)
APTT BLD: 20.9 SEC — LOW (ref 27–36.3)
BUN SERPL-MCNC: 67 MG/DL — HIGH (ref 7–23)
CALCIUM SERPL-MCNC: 9.4 MG/DL — SIGNIFICANT CHANGE UP (ref 8.4–10.5)
CHLORIDE SERPL-SCNC: 110 MMOL/L — HIGH (ref 98–107)
CO2 SERPL-SCNC: 20 MMOL/L — LOW (ref 22–31)
CREAT SERPL-MCNC: 1.57 MG/DL — HIGH (ref 0.5–1.3)
EGFR: 32 ML/MIN/1.73M2 — LOW
GLUCOSE SERPL-MCNC: 105 MG/DL — HIGH (ref 70–99)
HCT VFR BLD CALC: 22.2 % — LOW (ref 34.5–45)
HCT VFR BLD CALC: 24.5 % — LOW (ref 34.5–45)
HGB BLD-MCNC: 7.1 G/DL — LOW (ref 11.5–15.5)
HGB BLD-MCNC: 8.1 G/DL — LOW (ref 11.5–15.5)
INR BLD: 1.1 RATIO — SIGNIFICANT CHANGE UP (ref 0.88–1.16)
MAGNESIUM SERPL-MCNC: 1.8 MG/DL — SIGNIFICANT CHANGE UP (ref 1.6–2.6)
MCHC RBC-ENTMCNC: 30.6 PG — SIGNIFICANT CHANGE UP (ref 27–34)
MCHC RBC-ENTMCNC: 31.2 PG — SIGNIFICANT CHANGE UP (ref 27–34)
MCHC RBC-ENTMCNC: 32 GM/DL — SIGNIFICANT CHANGE UP (ref 32–36)
MCHC RBC-ENTMCNC: 33.1 GM/DL — SIGNIFICANT CHANGE UP (ref 32–36)
MCV RBC AUTO: 94.2 FL — SIGNIFICANT CHANGE UP (ref 80–100)
MCV RBC AUTO: 95.7 FL — SIGNIFICANT CHANGE UP (ref 80–100)
NRBC # BLD: 0 /100 WBCS — SIGNIFICANT CHANGE UP (ref 0–0)
NRBC # BLD: 0 /100 WBCS — SIGNIFICANT CHANGE UP (ref 0–0)
NRBC # FLD: 0.06 K/UL — HIGH (ref 0–0)
NRBC # FLD: 0.09 K/UL — HIGH (ref 0–0)
PHOSPHATE SERPL-MCNC: 3.1 MG/DL — SIGNIFICANT CHANGE UP (ref 2.5–4.5)
PLATELET # BLD AUTO: 298 K/UL — SIGNIFICANT CHANGE UP (ref 150–400)
PLATELET # BLD AUTO: 301 K/UL — SIGNIFICANT CHANGE UP (ref 150–400)
POTASSIUM SERPL-MCNC: 3.8 MMOL/L — SIGNIFICANT CHANGE UP (ref 3.5–5.3)
POTASSIUM SERPL-SCNC: 3.8 MMOL/L — SIGNIFICANT CHANGE UP (ref 3.5–5.3)
PROTHROM AB SERPL-ACNC: 12.8 SEC — SIGNIFICANT CHANGE UP (ref 10.5–13.4)
RBC # BLD: 2.32 M/UL — LOW (ref 3.8–5.2)
RBC # BLD: 2.6 M/UL — LOW (ref 3.8–5.2)
RBC # FLD: 15.7 % — HIGH (ref 10.3–14.5)
RBC # FLD: 16.4 % — HIGH (ref 10.3–14.5)
SODIUM SERPL-SCNC: 140 MMOL/L — SIGNIFICANT CHANGE UP (ref 135–145)
TSH SERPL-MCNC: 3.38 UIU/ML — SIGNIFICANT CHANGE UP (ref 0.27–4.2)
WBC # BLD: 15.03 K/UL — HIGH (ref 3.8–10.5)
WBC # BLD: 15.12 K/UL — HIGH (ref 3.8–10.5)
WBC # FLD AUTO: 15.03 K/UL — HIGH (ref 3.8–10.5)
WBC # FLD AUTO: 15.12 K/UL — HIGH (ref 3.8–10.5)

## 2023-04-03 PROCEDURE — 99233 SBSQ HOSP IP/OBS HIGH 50: CPT

## 2023-04-03 PROCEDURE — 43239 EGD BIOPSY SINGLE/MULTIPLE: CPT | Mod: GC

## 2023-04-03 PROCEDURE — 88305 TISSUE EXAM BY PATHOLOGIST: CPT | Mod: 26

## 2023-04-03 RX ORDER — PANTOPRAZOLE SODIUM 20 MG/1
40 TABLET, DELAYED RELEASE ORAL
Refills: 0 | Status: DISCONTINUED | OUTPATIENT
Start: 2023-04-03 | End: 2023-04-04

## 2023-04-03 RX ADMIN — PANTOPRAZOLE SODIUM 40 MILLIGRAM(S): 20 TABLET, DELAYED RELEASE ORAL at 05:58

## 2023-04-03 RX ADMIN — Medication 1 TABLET(S): at 14:12

## 2023-04-03 RX ADMIN — LATANOPROST 1 DROP(S): 0.05 SOLUTION/ DROPS OPHTHALMIC; TOPICAL at 21:39

## 2023-04-03 RX ADMIN — Medication 150 MICROGRAM(S): at 05:54

## 2023-04-03 RX ADMIN — PANTOPRAZOLE SODIUM 40 MILLIGRAM(S): 20 TABLET, DELAYED RELEASE ORAL at 14:12

## 2023-04-03 RX ADMIN — Medication 1000 UNIT(S): at 14:12

## 2023-04-03 RX ADMIN — PRAMIPEXOLE DIHYDROCHLORIDE 0.75 MILLIGRAM(S): 0.12 TABLET ORAL at 14:13

## 2023-04-03 RX ADMIN — PRAMIPEXOLE DIHYDROCHLORIDE 0.75 MILLIGRAM(S): 0.12 TABLET ORAL at 05:59

## 2023-04-03 RX ADMIN — GABAPENTIN 100 MILLIGRAM(S): 400 CAPSULE ORAL at 05:57

## 2023-04-03 RX ADMIN — GABAPENTIN 100 MILLIGRAM(S): 400 CAPSULE ORAL at 17:54

## 2023-04-03 RX ADMIN — PRAMIPEXOLE DIHYDROCHLORIDE 0.75 MILLIGRAM(S): 0.12 TABLET ORAL at 21:39

## 2023-04-03 RX ADMIN — ATORVASTATIN CALCIUM 40 MILLIGRAM(S): 80 TABLET, FILM COATED ORAL at 21:39

## 2023-04-03 NOTE — PROGRESS NOTE ADULT - PROBLEM SELECTOR PLAN 1
Pt. p/w multiple episodes of mixed melena/bright red rectal bleeding associated w/ symptomatic anemia (SOB, dizziness/lightheadedness w/ ambulation). Suspect UGIB 2/2 gastric ulcers from recent heavy NSAID use vs. gastritis vs. esophagitis. Low c/f brisk UGIB or LGIB   - s/p IV pantoprazole 80mg and 1U PRBC in ED w/ repeat Hgb 8.2  - Hgb now downtrending (8 --> 8.2 --> 7.8 --> 7.1)  - surgery consulted; appreciate recs- no acute intervention  - GI consulted; appreciate recs- plan for EGD 4/3  - ordered additional 1U PRBC; f/u post-transfusion CBC  - c/w IV pantoprazole 40 mg BID  - keep pt. NPO for EGD  - maintain 2 large bore IVs, active type and screen  - monitor CBC and transfuse for Hgb <7

## 2023-04-03 NOTE — PROGRESS NOTE ADULT - PROBLEM SELECTOR PLAN 8
Hx of Parkinson disease on pramipexole 0.75mg TID. Has mild resting tremor on exam  - c/w pramipexole 0.75mg TID

## 2023-04-03 NOTE — PROGRESS NOTE ADULT - SUBJECTIVE AND OBJECTIVE BOX
*******************************  Fabiano Rodriguez MD (PGY-1)  Internal Medicine  Contact via [employer] TEAMS  [facility] Pager: [phone]  [facility] Pager: [phone]  *******************************    PROGRESS NOTE:     Patient is a 88y old  Female who presents with a chief complaint of GI bleeding (02 Apr 2023 15:19)    INTERVAL EVENTS: Hgb stable overnight (7.8)    SUBJECTIVE: Patient seen and examined at bedside. This morning, the patient is comfortable and doing well. No acute complaints. Denies fevers, chills, N/V/D, chest pain, SOB, abdominal pain.    MEDICATIONS  (STANDING):  atorvastatin 40 milliGRAM(s) Oral at bedtime  cholecalciferol 1000 Unit(s) Oral daily  gabapentin 100 milliGRAM(s) Oral two times a day  latanoprost 0.005% Ophthalmic Solution 1 Drop(s) Both EYES at bedtime  levothyroxine 150 MICROGram(s) Oral daily  multivitamin 1 Tablet(s) Oral daily  pantoprazole  Injectable 40 milliGRAM(s) IV Push every 12 hours  pramipexole 0.75 milliGRAM(s) Oral three times a day  sodium chloride 0.9%. 1000 milliLiter(s) (50 mL/Hr) IV Continuous <Continuous>    MEDICATIONS  (PRN):  acetaminophen     Tablet .. 650 milliGRAM(s) Oral every 6 hours PRN Temp greater or equal to 38C (100.4F), Mild Pain (1 - 3)    CAPILLARY BLOOD GLUCOSE    I&O's Summary    02 Apr 2023 07:01  -  03 Apr 2023 07:00  --------------------------------------------------------  IN: 982 mL / OUT: 1200 mL / NET: -218 mL    PHYSICAL EXAM:  Vital Signs Last 24 Hrs  T(C): 36.6 (03 Apr 2023 05:44), Max: 36.9 (02 Apr 2023 13:02)  T(F): 97.9 (03 Apr 2023 05:44), Max: 98.4 (02 Apr 2023 13:02)  HR: 90 (03 Apr 2023 05:44) (88 - 92)  BP: 106/56 (03 Apr 2023 05:44) (106/56 - 128/55)  BP(mean): --  RR: 18 (03 Apr 2023 05:44) (17 - 18)  SpO2: 100% (03 Apr 2023 05:44) (96% - 100%)    Parameters below as of 03 Apr 2023 05:44  Patient On (Oxygen Delivery Method): room air    GENERAL: NAD, lying in bed comfortably  HEENT: no scleral icterus  HEART: S1, S2, regular rate and rhythm, +3/6 systolic murmur best heard at the upper sternal borders  LUNGS: Unlabored respirations.  Clear to auscultation bilaterally, no crackles, wheezing, or rhonchi  ABDOMEN: Soft, nontender, nondistended, +BS  RECTAL: deferred but per ED note- "melena w/ small amount of red blood"  EXTREMITIES: 2+ peripheral pulses bilaterally. No clubbing, cyanosis, or edema  NERVOUS SYSTEM:  A&Ox3, +resting tremor in LUE  SKIN: No rashes or lesions    LABS:                        7.1    15.03 )-----------( 301      ( 03 Apr 2023 06:49 )             22.2     Hgb trend: 8 --> 8.2 --> 7.8 --> 7.1    04-03    140  |  110<H>  |  67<H>  ----------------------------<  105<H>  3.8   |  20<L>  |  1.57<H>    Ca    9.4      03 Apr 2023 06:49  Phos  3.1     04-03  Mg     1.80     04-03    TPro  6.1  /  Alb  3.8  /  TBili  0.3  /  DBili  x   /  AST  23  /  ALT  15  /  AlkPhos  58  04-02    PT/INR - ( 03 Apr 2023 06:49 )   PT: 12.8 sec;   INR: 1.10 ratio    PTT - ( 03 Apr 2023 06:49 )  PTT:20.9 sec    RADIOLOGY & ADDITIONAL TESTS:  Results Reviewed:   Imaging Personally Reviewed:  Electrocardiogram Personally Reviewed:  Tele:    COORDINATION OF CARE:  Care Discussed with Consultants/Other Providers [Y/N]:  Prior or Outpatient Records Reviewed [Y/N]:

## 2023-04-03 NOTE — PROGRESS NOTE ADULT - PROBLEM SELECTOR PLAN 4
Hx of HTN on atenolol 50mg qd, losartan 100mg qd, Dyazide 37.5mg-25mg qd at home. Was also prescribed furosemide 40mg BID for leg edema, but pt no longer taking  - SBPs on admission 100s-130s  - hold antihypertensives given active bleeding/soft BPs  - monitor SBPs

## 2023-04-03 NOTE — PRE-OP CHECKLIST - HEART RATE (BEATS/MIN)
Bedside shift change report given to Ericka Soriano RN (oncoming nurse) by JETT Pearce (offgoing nurse). Report included the following information SBAR, Kardex, Intake/Output, MAR, Accordion and Cardiac Rhythm NSR. 86

## 2023-04-03 NOTE — PROGRESS NOTE ADULT - PROBLEM SELECTOR PLAN 3
CXR on admission showing LLL consolidation w/ leukocytosis to 26k. Pt. w/o symptoms including cough, fevers, chills, SOB. SpO2 >92% on room air  - RVP/COVID negative, procal normal   - f/u BCx, urine legionella, urine strep  - will monitor off of abx at this time as pt. does not meet sepsis criteria and is comfortable on room air  - monitor WBC count and temperature curve

## 2023-04-03 NOTE — PROGRESS NOTE ADULT - PROBLEM SELECTOR PLAN 9
DVT PPx: hold chemical VTE ppx at this time given active GI bleeding  Diet: DASH/TLC; NPO after midnight for EGD 4/3  Dispo: pending clinical improvement  Code Status: full code

## 2023-04-03 NOTE — PROGRESS NOTE ADULT - PROBLEM SELECTOR PLAN 7
Hx of sciatica, was taking multiple doses of NSAIDs and Tylenol at home due to incomplete relief  - c/w gabapentin 100mg BID  - Tylenol PRN  - avoid NSAIDs given GI bleeding

## 2023-04-03 NOTE — PRE-OP CHECKLIST - IDENTIFICATION BAND VERIFIED
3/19/2018         RE: Sanjay Burton  [address] [facility] [id number]        Dear Colleague,    Thank you for referring your patient, Sanjay Burton, to the [facility]. Please see a copy of my visit note below.    F/U for pilonidal cyst excision    Subjective:  P t feels good. Wound was closed - Patient squatted to  freezer - felt a tearing and some bleeding - ? Re-opened wound.    Objective:  B/P: 122/74, T: 97.7, P: Data Unavailable, R: Data Unavailable  Back: Excision site clean.  100% granulation tissue - filled in nicely.  3x0.5 cm 100% granulation  Torn epithelialization      Path -   FINAL DIAGNOSIS:   Skin ellipse with attached portion of subcutaneous fat, excision:   - Pilonidal cyst with fibrosis and chronic inflammation.     Electronically signed out by:     Teodora Mendez M.D.       Assessment/Plan:  Pt s/p excision of pilonidal cyst.  No further bleeding.  Wound filling in nicely.   Will start silvercel daily to wound.   F/U 2-3 weeks for wound check or sooner if problems develop.    Enoch Acosta MD, FACS        Again, thank you for allowing me to participate in the care of your patient.        Sincerely,        Enoch Acosta MD    
done

## 2023-04-03 NOTE — PROGRESS NOTE ADULT - PROBLEM SELECTOR PLAN 2
SCr 1.61 on admission (baseline SCr 1.11), possibly pre-renal OLIVER 2/2 renal hypoperfusion from GI bleeding vs. ATN; improving  - FENa 0.7%, c/w pre-renal OLIVER  - avoid nephrotoxins, renally dose medications as per GFR  - monitor I/Os, UOP, SCr

## 2023-04-03 NOTE — PROGRESS NOTE ADULT - ASSESSMENT
88F w/ HTN, HLD, hypothyroidism, Parkinson's disease, sciatica who presents w/ multiple episodes of mixed melena/bright red rectal bleeding after NSAID use, found to have symptomatic anemia s/p PRBC transfusion, currently admitted for GI bleeding pending upper endoscopy +/- colonoscopy with GI. Pt. also with OLIVER and left lower lobe consolidation, currently stable.

## 2023-04-04 ENCOUNTER — TRANSCRIPTION ENCOUNTER (OUTPATIENT)
Age: 88
End: 2023-04-04

## 2023-04-04 VITALS
SYSTOLIC BLOOD PRESSURE: 137 MMHG | HEART RATE: 100 BPM | OXYGEN SATURATION: 97 % | RESPIRATION RATE: 18 BRPM | DIASTOLIC BLOOD PRESSURE: 71 MMHG | TEMPERATURE: 97 F

## 2023-04-04 LAB
ANION GAP SERPL CALC-SCNC: 10 MMOL/L — SIGNIFICANT CHANGE UP (ref 7–14)
BUN SERPL-MCNC: 45 MG/DL — HIGH (ref 7–23)
CALCIUM SERPL-MCNC: 9.1 MG/DL — SIGNIFICANT CHANGE UP (ref 8.4–10.5)
CHLORIDE SERPL-SCNC: 108 MMOL/L — HIGH (ref 98–107)
CO2 SERPL-SCNC: 21 MMOL/L — LOW (ref 22–31)
CREAT SERPL-MCNC: 1.48 MG/DL — HIGH (ref 0.5–1.3)
EGFR: 34 ML/MIN/1.73M2 — LOW
GLUCOSE SERPL-MCNC: 106 MG/DL — HIGH (ref 70–99)
HCT VFR BLD CALC: 24.7 % — LOW (ref 34.5–45)
HGB BLD-MCNC: 8.1 G/DL — LOW (ref 11.5–15.5)
LEGIONELLA AG UR QL: NEGATIVE — SIGNIFICANT CHANGE UP
MAGNESIUM SERPL-MCNC: 1.7 MG/DL — SIGNIFICANT CHANGE UP (ref 1.6–2.6)
MCHC RBC-ENTMCNC: 31 PG — SIGNIFICANT CHANGE UP (ref 27–34)
MCHC RBC-ENTMCNC: 32.8 GM/DL — SIGNIFICANT CHANGE UP (ref 32–36)
MCV RBC AUTO: 94.6 FL — SIGNIFICANT CHANGE UP (ref 80–100)
NRBC # BLD: 0 /100 WBCS — SIGNIFICANT CHANGE UP (ref 0–0)
NRBC # FLD: 0.09 K/UL — HIGH (ref 0–0)
PHOSPHATE SERPL-MCNC: 2.4 MG/DL — LOW (ref 2.5–4.5)
PLATELET # BLD AUTO: 286 K/UL — SIGNIFICANT CHANGE UP (ref 150–400)
POTASSIUM SERPL-MCNC: 3.8 MMOL/L — SIGNIFICANT CHANGE UP (ref 3.5–5.3)
POTASSIUM SERPL-SCNC: 3.8 MMOL/L — SIGNIFICANT CHANGE UP (ref 3.5–5.3)
RBC # BLD: 2.61 M/UL — LOW (ref 3.8–5.2)
RBC # FLD: 16.3 % — HIGH (ref 10.3–14.5)
SODIUM SERPL-SCNC: 139 MMOL/L — SIGNIFICANT CHANGE UP (ref 135–145)
WBC # BLD: 13.92 K/UL — HIGH (ref 3.8–10.5)
WBC # FLD AUTO: 13.92 K/UL — HIGH (ref 3.8–10.5)

## 2023-04-04 PROCEDURE — 99239 HOSP IP/OBS DSCHRG MGMT >30: CPT

## 2023-04-04 PROCEDURE — 99232 SBSQ HOSP IP/OBS MODERATE 35: CPT | Mod: GC

## 2023-04-04 RX ORDER — PANTOPRAZOLE SODIUM 20 MG/1
1 TABLET, DELAYED RELEASE ORAL
Qty: 30 | Refills: 0
Start: 2023-04-04 | End: 2023-05-03

## 2023-04-04 RX ORDER — CALCIUM CARBONATE 500(1250)
1 TABLET ORAL THREE TIMES A DAY
Refills: 0 | Status: DISCONTINUED | OUTPATIENT
Start: 2023-04-04 | End: 2023-04-04

## 2023-04-04 RX ORDER — POLYETHYLENE GLYCOL 3350 17 G/17G
17 POWDER, FOR SOLUTION ORAL ONCE
Refills: 0 | Status: COMPLETED | OUTPATIENT
Start: 2023-04-04 | End: 2023-04-04

## 2023-04-04 RX ADMIN — PRAMIPEXOLE DIHYDROCHLORIDE 0.75 MILLIGRAM(S): 0.12 TABLET ORAL at 13:18

## 2023-04-04 RX ADMIN — GABAPENTIN 100 MILLIGRAM(S): 400 CAPSULE ORAL at 06:45

## 2023-04-04 RX ADMIN — Medication 1000 UNIT(S): at 11:10

## 2023-04-04 RX ADMIN — PRAMIPEXOLE DIHYDROCHLORIDE 0.75 MILLIGRAM(S): 0.12 TABLET ORAL at 06:45

## 2023-04-04 RX ADMIN — Medication 1 TABLET(S): at 11:10

## 2023-04-04 RX ADMIN — Medication 150 MICROGRAM(S): at 05:51

## 2023-04-04 RX ADMIN — PANTOPRAZOLE SODIUM 40 MILLIGRAM(S): 20 TABLET, DELAYED RELEASE ORAL at 06:45

## 2023-04-04 RX ADMIN — POLYETHYLENE GLYCOL 3350 17 GRAM(S): 17 POWDER, FOR SOLUTION ORAL at 09:46

## 2023-04-04 NOTE — DISCHARGE NOTE PROVIDER - NSDCCPTREATMENT_GEN_ALL_CORE_FT
PRINCIPAL PROCEDURE  Procedure: Upper endoscopy  Findings and Treatment: Findings:       The examined esophagus was normal.       A large Type III hiatal hernia was present.       Patchy moderate mucosal changes characterized by erythema and erosion        were found in the gastric body and in the gastric antrum. Biopsies were      taken with a cold forceps for Helicobacter pylori testing.       Scattered moderate inflammation characterized by erosions, erythema and        shallow ulcerations was found in the first portion of the duodenum.       The second portion of the duodenum was normal.                                                                                   Impression:          - Normal esophagus.                       - Large Type 3 Hiatal hernia.                       - Erythematous and eroded mucosa in the gastric                        body/antrum with duodenitis, likely source melena.                       - Normal second portion of the duodenum.        SECONDARY PROCEDURE  Procedure: X-ray, chest, 1 view  Findings and Treatment: FINDINGS:  The heart is normal in size.  Left lower lobe patchy opacity.  There is no pneumothorax or pleural effusion.  IMPRESSION:  Lower lobe patchy opacity

## 2023-04-04 NOTE — PROGRESS NOTE ADULT - PROBLEM SELECTOR PLAN 9
DVT PPx: hold chemical VTE ppx at this time given GI bleeding  Diet: DASH/TLC  Dispo: pending clinical improvement  Code Status: full code

## 2023-04-04 NOTE — DISCHARGE NOTE PROVIDER - NSFOLLOWUPCLINICS_GEN_ALL_ED_FT
[facility]  Gastroenterology  [address] [place] [id number]  Phone: [phone]  Fax:   Follow Up Time: 1 month

## 2023-04-04 NOTE — DISCHARGE NOTE NURSING/CASE MANAGEMENT/SOCIAL WORK - PATIENT PORTAL LINK FT
You can access the FollowMyHealth Patient Portal offered by [facility] by registering at the following website: [email or web address]. By joining [employer]’s FollowMyHealth portal, you will also be able to view your health information using other applications (apps) compatible with our system.

## 2023-04-04 NOTE — PROGRESS NOTE ADULT - PROBLEM SELECTOR PLAN 4
Hx of HTN on atenolol 50mg qd, losartan 100mg qd, Dyazide 37.5mg-25mg qd at home. Was also prescribed furosemide 40mg BID for leg edema, but pt no longer taking  - SBPs on admission 100s-130s  - hold antihypertensives given GI bleeding/soft BPs; can restart on discharge  - monitor SBPs

## 2023-04-04 NOTE — PHYSICAL THERAPY INITIAL EVALUATION ADULT - ADDITIONAL COMMENTS
Pt lives alone in a house with 4 stairs to enter; Pt resides on the first floor. prior to admission Pt was independent with all mobility and ambulated with a rollator. Pt owns a rolling walker, straight cane and wheelchair.     Pt. left comfortable sitting edge of bed, call bell in reach and in NAD.

## 2023-04-04 NOTE — DISCHARGE NOTE PROVIDER - NSDCCPCAREPLAN_GEN_ALL_CORE_FT
PRINCIPAL DISCHARGE DIAGNOSIS  Diagnosis: UGIB (upper gastrointestinal bleed)  Assessment and Plan of Treatment: You came to the hospital because you were having multiple episodes of rectal bleeding associated with dark stools after using painkillers (ibuprofen) at home. When you came to the hospital, your blood count was low and you were having associated dizziness, lightheadedness and shortness of breath with ambulation. You received several units of blood which helped your symptoms. You were seen by the GI doctors who recommended starting you on an acid-lowering medication (pantoprazole) as well as an upper endoscopy (camera down your throat). The upper endoscopy showed inflammation in your stomach and the first part of the intestine, consistent with side effects of heavy ibuprofen use. You were continued on oral pantprazole, which you should continue to take daily when you leave the hospital. If you have pain, avoid NSAIDS, medications such as ibuprofen, Motrin, Aleeve, Naproxen, etc. and use alternatives such as Tylenol. Please follow up with GI and your PCP when you leave the hospital. If you have recurrence of GI bleeding, bloody vomit, abdominal pain, dizziness/lightheadedness, chest pain, palpitations, shortness of breath, contact your doctor or return back to the ED immediately.      SECONDARY DISCHARGE DIAGNOSES  Diagnosis: OLIVER (acute kidney injury)  Assessment and Plan of Treatment: During this admission, your kidney number was noted to be elevated, most likely due to acute blood loss from GI bleeding. Your kidney numbers improved with IV fluids and blood transfusion. Please follow up with your PCP when you leave the hospital. If you notice decreased urine output, darker urine, nausea, vomiting, or flank pain, contact your doctor or return back to the ED.    Diagnosis: Leukocytosis  Assessment and Plan of Treatment: During this admission, your white blood cell count was noted to be elevated. Your xray showed a small area of possible pneumonia but we monitored you off of antibiotics as you did not have a fever, chills, shortness of breath, or cough. Please follow up with your PCP when you leave the hospital. If you have high fevers, chills, shortness of breath, coughing or other signs of infection, please contact your doctor or return back to the ED.

## 2023-04-04 NOTE — PROGRESS NOTE ADULT - ATTENDING COMMENTS
s/p EGD  type 3 HH  Hgb stable at 8.1
Patient is an 87yo F with PMH of Parkinson's, solitary kidney (removed several decades prior), hypothyroidism, HTN, and HLD who presented with BRBPR and melena in setting of recent significant NSAID use for back pain/sciatica.   Admitted for acute blood loss anemia due to GI bleeding in setting of NSAID use, planned for endoscopy today with GI. Hb goal >7, transfuse PRN.  CXR also with LLL opacity but not clinically suspicious for PNA. Leukocytosis to 20 suspected as a stress reaction to bleeding. Trend WBC count and monitor for fevers, low threshold to start CAP coverage. Can obtain repeat CXR as outpatient.  Creatinine mildly elevated in setting of single kidney and recent significant NSAID use. Offered IV hydration. Check FENa. Suspect pre-renal in nature in setting of bleeding.     Discussed with resident team, , , and nursing manager.
Patient is an 89yo F with PMH of Parkinson's, solitary kidney (removed several decades prior), hypothyroidism, HTN, and HLD who presented with BRBPR and melena in setting of recent significant NSAID use for back pain/sciatica.     Admitted for acute blood loss anemia due to GI bleeding in setting of NSAID use, s/p EGD 4/3:    - Normal esophagus.  - Large Type 3 Hiatal hernia.  - Erythematous and eroded mucosa in the gastric  body/antrum with duodenitis, likely source melena.  - Normal second portion of the duodenum.    Continue daily PPI. Outpatient GI follow-up for possible colonoscopy in future. Hemoglobin remains stable at this time.     CXR also with LLL opacity but not clinically suspicious for PNA. Leukocytosis to 20 suspected as a stress reaction to bleeding. Trend WBC count and monitor for fevers, low threshold to start CAP coverage. Can obtain repeat CXR as outpatient.    Creatinine mildly elevated in setting of single kidney and recent significant NSAID use. Offered IV hydration. FENa 0.7% indicative of pre-renal etiology. Improving. Encourage adequate po hydration.    Discussed with resident team, , , and nursing manager.     Likely discharge to home today if cleared by GI.

## 2023-04-04 NOTE — DISCHARGE NOTE PROVIDER - HOSPITAL COURSE
88F w/ HTN, HLD, hypothyroidism, Parkinson's disease, sciatica who presented with multiple episodes of mixed melena/bright red rectal bleeding after NSAID use. On admission, her hemoglobin was 8, down from baseline of 12 with associated dyspnea on exertion, dizziness, lightheadedness) with improvement after receiving 2U of packed red blood cells. She was started on IV PPI and seen by GI who performed an upper endoscopy. Endoscopy was significant for type III hiatal hernia, erythema and erosion of the mucosa in the gastric body/antrum w/ duodenitis and normal 2nd portion of the duodenum. The patient was transitioned to oral PPI daily and remained hemodynamically stable without further episodes of bleeding. During this admission, the patient was found to have an OLIVER, likely due to hypovolemia from multiple episodes of rectal bleeding. Her renal function improved with 2U of blood and IV fluids. She was also found to have a left lower lobe consolidation on admission xray with associated leukocytosis but as she never had a fever, cough, or shortness of breath she was monitored off of antibiotics. Her antihypertensives were held during this admission and will be restarted at home.    The patient is afebrile, hemodynamically stable and medically optimized for discharge to home with follow up with GI and her PCP. On day of discharge, patient is clinically stable with no new exam findings or acute symptoms compared to prior. The patient was seen by the attending physician on the date of discharge and deemed stable and acceptable for discharge. The patient's chronic medical conditions were treated accordingly per the patient's home medication regimen. The patient's medication reconciliation (with changes made to chronic medications), follow up appointments, discharge orders, instructions, and significant lab and diagnostic studies are as noted.

## 2023-04-04 NOTE — DISCHARGE NOTE PROVIDER - NSDCFUADDAPPT_GEN_ALL_CORE_FT
Please follow up with your PCP (Dr. Duarte) within 2 weeks of discharge.    Please follow up with GI within 2-4 weeks of discharge.

## 2023-04-04 NOTE — DISCHARGE NOTE NURSING/CASE MANAGEMENT/SOCIAL WORK - NSDCPEFALRISK_GEN_ALL_CORE
For information on Fall & Injury Prevention, visit: [email or web address] OR  [email or web address] OR  https://www.cdc.gov/steadi/patient.html

## 2023-04-04 NOTE — PROGRESS NOTE ADULT - PROBLEM SELECTOR PLAN 1
Pt. p/w multiple episodes of mixed melena/bright red rectal bleeding associated w/ symptomatic anemia (SOB, dizziness/lightheadedness w/ ambulation). Suspect UGIB 2/2 gastric ulcers from recent heavy NSAID use vs. gastritis vs. esophagitis. Low c/f brisk UGIB or LGIB. H/H now stable s/p 2U PRBC and EGD  - surgery consulted; appreciate recs- no acute intervention  - GI consulted; appreciate recs  - EGD (4/3): type III hiatal hernia, erythematous and eroded mucosa in the gastric body/antrum w/ duodenitis and normal 2nd portion of duodenum  - transitioned to PO pantoprazole 40 mg qd  - maintain 2 large bore IVs, active type and screen  - monitor CBC and transfuse for Hgb <7

## 2023-04-04 NOTE — PROGRESS NOTE ADULT - ASSESSMENT
87 yo F w/ HTN, HLD, hypothyroidism, Parkinson's disease, sciatica who presents w/ melena x 2d in the setting of 1 week of heavy NSAID use.    #Melena  s/p EGD on 4/3 with large type 3 HH with gastritis and duodenitis. Biopsy pending    #Sciatica, takes NSAIDs at home    Recommendations  -PPI daily  -Consider colonoscopy if patient were to re-develop overt Gi bleeding with drop in Hb    No further work up from our perspective.    Recommendations preliminary until signed by attending.     Manuel Holloway MD  Gastroenterology/Hepatology Fellow  1st option: [phone] (text or call), ONLY available from 7:00 am to 5:00 pm.   **Contact on-call GI fellow via answering service ([phone]) from 5pm-7am AND on weekends/holidays**  2nd option: Available via [employer] Teams  3rd option: Pager: [phone]

## 2023-04-04 NOTE — PROGRESS NOTE ADULT - SUBJECTIVE AND OBJECTIVE BOX
*******************************  Fabiano Rodriguez MD (PGY-1)  Internal Medicine  Contact via [employer] TEAMS  [facility] Pager: [phone]  [facility] Pager: [phone]  *******************************    PROGRESS NOTE:     Patient is a 88y old  Female who presents with a chief complaint of GI bleeding (03 Apr 2023 08:21)    INTERVAL EVENTS: s/p EGD yesterday showing large type III hiatal hernia, erythematous and eroded mucosa in the gastric body/antrum w/ duodenitis and normal 2nd portion of duodenum. No acute overnight events; Hgb 8.1 and stable     SUBJECTIVE: Patient seen and examined at bedside. This morning, the patient is comfortable and doing well. No acute complaints. Denies fevers, chills, N/V/D, chest pain, SOB, abdominal pain.    MEDICATIONS  (STANDING):  atorvastatin 40 milliGRAM(s) Oral at bedtime  cholecalciferol 1000 Unit(s) Oral daily  gabapentin 100 milliGRAM(s) Oral two times a day  latanoprost 0.005% Ophthalmic Solution 1 Drop(s) Both EYES at bedtime  levothyroxine 150 MICROGram(s) Oral daily  multivitamin 1 Tablet(s) Oral daily  pantoprazole    Tablet 40 milliGRAM(s) Oral before breakfast  pramipexole 0.75 milliGRAM(s) Oral three times a day    MEDICATIONS  (PRN):  acetaminophen     Tablet .. 650 milliGRAM(s) Oral every 6 hours PRN Temp greater or equal to 38C (100.4F), Mild Pain (1 - 3)    CAPILLARY BLOOD GLUCOSE    I&O's Summary    03 Apr 2023 07:01  -  04 Apr 2023 07:00  --------------------------------------------------------  IN: 300 mL / OUT: 0 mL / NET: 300 mL    PHYSICAL EXAM:  Vital Signs Last 24 Hrs  T(C): 36.3 (04 Apr 2023 07:02), Max: 36.9 (03 Apr 2023 14:05)  T(F): 97.3 (04 Apr 2023 07:02), Max: 98.5 (03 Apr 2023 14:05)  HR: 100 (04 Apr 2023 07:02) (76 - 100)  BP: 146/68 (04 Apr 2023 07:02) (108/58 - 146/68)  BP(mean): 74 (03 Apr 2023 13:30) (74 - 82)  RR: 18 (04 Apr 2023 07:02) (18 - 24)  SpO2: 98% (04 Apr 2023 07:02) (95% - 100%)    Parameters below as of 04 Apr 2023 07:02  Patient On (Oxygen Delivery Method): room air    GENERAL: NAD, lying in bed comfortably  HEAD: Atraumatic, normocephalic  EYES: EOMI, PERRLA, conjunctiva and sclera clear  ENT: Moist mucous membranes  NECK: Supple, no JVD  HEART: S1, S2, Regular rate and rhythm, no murmurs, rubs, or gallops  LUNGS: Unlabored respirations, clear to auscultation bilaterally, no crackles, wheezing, or rhonchi  ABDOMEN: Soft, nontender, nondistended, +BS  EXTREMITIES: 2+ peripheral pulses bilaterally. No clubbing, cyanosis, or edema  NERVOUS SYSTEM:  A&Ox3, no focal deficits   SKIN: No rashes or lesions    LABS:                        8.1    13.92 )-----------( 286      ( 04 Apr 2023 03:07 )             24.7     04-04    139  |  108<H>  |  45<H>  ----------------------------<  106<H>  3.8   |  21<L>  |  1.48<H>    Ca    9.1      04 Apr 2023 03:07  Phos  2.4     04-04  Mg     1.70     04-04    PT/INR - ( 03 Apr 2023 06:49 )   PT: 12.8 sec;   INR: 1.10 ratio    PTT - ( 03 Apr 2023 06:49 )  PTT:20.9 sec    Culture - Blood (collected 02 Apr 2023 09:20)  Source: .Blood Blood-Peripheral  Preliminary Report (03 Apr 2023 15:01):    No growth to date.    Culture - Blood (collected 02 Apr 2023 09:15)  Source: .Blood Blood-Peripheral  Preliminary Report (03 Apr 2023 15:01):    No growth to date.    RADIOLOGY & ADDITIONAL TESTS:  Results Reviewed:   Findings:       The examined esophagus was normal.       A large Type III hiatal hernia was present.       Patchy moderate mucosal changes characterized by erythema and erosion        were found in the gastric body and in the gastric antrum. Biopsies were      taken with a cold forceps for Helicobacter pylori testing.       Scattered moderate inflammation characterized by erosions, erythema and        shallow ulcerations was found in the first portion of the duodenum.       The second portion of the duodenum was normal.                                                                                   Impression:          - Normal esophagus.                       - Large Type 3 Hiatal hernia.                       - Erythematous and eroded mucosa in the gastric                        body/antrum with duodenitis, likely source melena.                       - Normal second portion of the duodenum.    Imaging Personally Reviewed:  Electrocardiogram Personally Reviewed:  Tele: *******************************  Fabiano Rodriguez MD (PGY-1)  Internal Medicine  Contact via [employer] TEAMS  [facility] Pager: [phone]  [facility] Pager: [phone]  *******************************    PROGRESS NOTE:     Patient is a 88y old  Female who presents with a chief complaint of GI bleeding (03 Apr 2023 08:21)    INTERVAL EVENTS: s/p EGD yesterday showing large type III hiatal hernia, erythematous and eroded mucosa in the gastric body/antrum w/ duodenitis and normal 2nd portion of duodenum. No acute overnight events; Hgb 8.1 and stable     SUBJECTIVE: Patient seen and examined at bedside. This morning, the patient is comfortable and doing well. No acute complaints. Feels ready for discharge home. Denies fevers, chills, N/V/D, chest pain, SOB, abdominal pain. Has not had a BM and was requesting a laxative.    MEDICATIONS  (STANDING):  atorvastatin 40 milliGRAM(s) Oral at bedtime  cholecalciferol 1000 Unit(s) Oral daily  gabapentin 100 milliGRAM(s) Oral two times a day  latanoprost 0.005% Ophthalmic Solution 1 Drop(s) Both EYES at bedtime  levothyroxine 150 MICROGram(s) Oral daily  multivitamin 1 Tablet(s) Oral daily  pantoprazole    Tablet 40 milliGRAM(s) Oral before breakfast  pramipexole 0.75 milliGRAM(s) Oral three times a day    MEDICATIONS  (PRN):  acetaminophen     Tablet .. 650 milliGRAM(s) Oral every 6 hours PRN Temp greater or equal to 38C (100.4F), Mild Pain (1 - 3)  calcium carbonate    500 mG (Tums) Chewable 1 Tablet(s) Chew three times a day PRN Gas    CAPILLARY BLOOD GLUCOSE    I&O's Summary    03 Apr 2023 07:01  -  04 Apr 2023 07:00  --------------------------------------------------------  IN: 300 mL / OUT: 0 mL / NET: 300 mL    PHYSICAL EXAM:  Vital Signs Last 24 Hrs  T(C): 36.3 (04 Apr 2023 07:02), Max: 36.9 (03 Apr 2023 14:05)  T(F): 97.3 (04 Apr 2023 07:02), Max: 98.5 (03 Apr 2023 14:05)  HR: 100 (04 Apr 2023 07:02) (76 - 100)  BP: 146/68 (04 Apr 2023 07:02) (108/58 - 146/68)  BP(mean): 74 (03 Apr 2023 13:30) (74 - 82)  RR: 18 (04 Apr 2023 07:02) (18 - 24)  SpO2: 98% (04 Apr 2023 07:02) (95% - 100%)    Parameters below as of 04 Apr 2023 07:02  Patient On (Oxygen Delivery Method): room air    GENERAL: NAD, lying in bed comfortably  HEENT: no scleral icterus  HEART: S1, S2, regular rate and rhythm, +3/6 systolic murmur best heard at the upper sternal borders  LUNGS: Unlabored respirations.  Clear to auscultation bilaterally, no crackles, wheezing, or rhonchi  ABDOMEN: Soft, nontender, nondistended, +BS  RECTAL: deferred but per ED note- "melena w/ small amount of red blood"  EXTREMITIES: 2+ peripheral pulses bilaterally. No clubbing, cyanosis, or edema  NERVOUS SYSTEM:  A&Ox3, +resting tremor in LUE  SKIN: No rashes or lesions    LABS:                        8.1    13.92 )-----------( 286      ( 04 Apr 2023 03:07 )             24.7     04-04    139  |  108<H>  |  45<H>  ----------------------------<  106<H>  3.8   |  21<L>  |  1.48<H>    Ca    9.1      04 Apr 2023 03:07  Phos  2.4     04-04  Mg     1.70     04-04    PT/INR - ( 03 Apr 2023 06:49 )   PT: 12.8 sec;   INR: 1.10 ratio    PTT - ( 03 Apr 2023 06:49 )  PTT:20.9 sec    Culture - Blood (collected 02 Apr 2023 09:20)  Source: .Blood Blood-Peripheral  Preliminary Report (03 Apr 2023 15:01):    No growth to date.    Culture - Blood (collected 02 Apr 2023 09:15)  Source: .Blood Blood-Peripheral  Preliminary Report (03 Apr 2023 15:01):    No growth to date.    RADIOLOGY & ADDITIONAL TESTS:  Results Reviewed:   Findings:       The examined esophagus was normal.       A large Type III hiatal hernia was present.       Patchy moderate mucosal changes characterized by erythema and erosion        were found in the gastric body and in the gastric antrum. Biopsies were      taken with a cold forceps for Helicobacter pylori testing.       Scattered moderate inflammation characterized by erosions, erythema and        shallow ulcerations was found in the first portion of the duodenum.       The second portion of the duodenum was normal.                                                                                   Impression:          - Normal esophagus.                       - Large Type 3 Hiatal hernia.                       - Erythematous and eroded mucosa in the gastric                        body/antrum with duodenitis, likely source melena.                       - Normal second portion of the duodenum.    Imaging Personally Reviewed:  Electrocardiogram Personally Reviewed:  Tele:

## 2023-04-04 NOTE — PROGRESS NOTE ADULT - PROBLEM SELECTOR PLAN 3
CXR on admission showing LLL consolidation w/ leukocytosis to 26k. Pt. w/o symptoms including cough, fevers, chills, SOB. SpO2 >92% on room air  - RVP/COVID negative, procal normal, BCx (4/2): NGTD,   - will monitor off of abx at this time as pt. does not meet sepsis criteria and is comfortable on room air  - monitor WBC count and temperature curve CXR on admission showing LLL consolidation w/ leukocytosis to 26k. Pt. w/o symptoms including cough, fevers, chills, SOB. SpO2 >92% on room air  - leukocytosis improving  - RVP/COVID negative, procal normal, BCx (4/2): NGTD,   - will monitor off of abx at this time as pt. does not meet sepsis criteria and is comfortable on room air  - monitor WBC count and temperature curve

## 2023-04-04 NOTE — PROGRESS NOTE ADULT - SUBJECTIVE AND OBJECTIVE BOX
Interval Events:   s/p EGD  Hb stable    Hospital Medications:  acetaminophen     Tablet .. 650 milliGRAM(s) Oral every 6 hours PRN  atorvastatin 40 milliGRAM(s) Oral at bedtime  cholecalciferol 1000 Unit(s) Oral daily  gabapentin 100 milliGRAM(s) Oral two times a day  latanoprost 0.005% Ophthalmic Solution 1 Drop(s) Both EYES at bedtime  levothyroxine 150 MICROGram(s) Oral daily  multivitamin 1 Tablet(s) Oral daily  pantoprazole    Tablet 40 milliGRAM(s) Oral before breakfast  polyethylene glycol 3350 17 Gram(s) Oral once  pramipexole 0.75 milliGRAM(s) Oral three times a day      ROS: All system reviewed and negative except as mentioned above.    PHYSICAL EXAM:   Vital Signs:  Vital Signs Last 24 Hrs  T(C): 36.3 (04 Apr 2023 07:02), Max: 36.9 (03 Apr 2023 14:05)  T(F): 97.3 (04 Apr 2023 07:02), Max: 98.5 (03 Apr 2023 14:05)  HR: 100 (04 Apr 2023 07:02) (76 - 100)  BP: 146/68 (04 Apr 2023 07:02) (108/58 - 146/68)  BP(mean): 74 (03 Apr 2023 13:30) (74 - 82)  RR: 18 (04 Apr 2023 07:02) (18 - 24)  SpO2: 98% (04 Apr 2023 07:02) (95% - 100%)    Parameters below as of 04 Apr 2023 07:02  Patient On (Oxygen Delivery Method): room air      Daily Height in cm: 154.2 (03 Apr 2023 11:40)    Daily     GENERAL:  NAD, Appears stated age  HEENT:  NC/AT,  conjunctivae clear and pink, sclera -anicteric  CHEST:  Normal Effort, Breath sounds clear  HEART:  RRR, S1 + S2, no murmurs  ABDOMEN:  Soft, non-tender, non-distended, normoactive bowel sounds,  no masses  EXTREMITIES:  no cyanosis or edema  SKIN:  Warm & Dry. No rash or erythema  NEURO:  Alert, oriented, no focal deficit    LABS:                        8.1    13.92 )-----------( 286      ( 04 Apr 2023 03:07 )             24.7     Mean Cell Volume: 94.6 fL (04-04-23 @ 03:07)    04-04    139  |  108<H>  |  45<H>  ----------------------------<  106<H>  3.8   |  21<L>  |  1.48<H>    Ca    9.1      04 Apr 2023 03:07  Phos  2.4     04-04  Mg     1.70     04-04        PT/INR - ( 03 Apr 2023 06:49 )   PT: 12.8 sec;   INR: 1.10 ratio         PTT - ( 03 Apr 2023 06:49 )  PTT:20.9 sec                            8.1    13.92 )-----------( 286      ( 04 Apr 2023 03:07 )             24.7                         8.1    15.12 )-----------( 298      ( 03 Apr 2023 19:30 )             24.5                         7.1    15.03 )-----------( 301      ( 03 Apr 2023 06:49 )             22.2                         7.8    17.95 )-----------( 357      ( 02 Apr 2023 23:23 )             23.5                         8.2    20.73 )-----------( 367      ( 02 Apr 2023 10:00 )             24.8       Imaging: Images reviewed.    < from: Upper Endoscopy (04.03.23 @ 11:25) >                                                                                   Impression:          - Normal esophagus.                       - Large Type 3 Hiatal hernia.                       - Erythematous and eroded mucosa in the gastric                        body/antrum with duodenitis, likely source melena.                       - Normal second portion of the duodenum.  Recommendation:      - Return patient to hospital [facility] for ongoing care.                       - Resume regular diet today.                       - Await pathology results.                       - Continue PPI daily                       - Consider Colonoscopy if patient were to re-develop                        overt GI bleeding with drop in hemoglobin.                                                                                     < end of copied text >

## 2023-04-04 NOTE — DISCHARGE NOTE PROVIDER - ATTENDING DISCHARGE PHYSICAL EXAMINATION:
PHYSICAL EXAM:  Vital Signs Last 24 Hrs  T(C): 36.3 (04 Apr 2023 07:02), Max: 36.9 (03 Apr 2023 14:05)  T(F): 97.3 (04 Apr 2023 07:02), Max: 98.5 (03 Apr 2023 14:05)  HR: 100 (04 Apr 2023 07:02) (76 - 100)  BP: 146/68 (04 Apr 2023 07:02) (108/58 - 146/68)  BP(mean): 74 (03 Apr 2023 13:30) (74 - 82)  RR: 18 (04 Apr 2023 07:02) (18 - 20)  SpO2: 98% (04 Apr 2023 07:02) (95% - 100%)    Parameters below as of 04 Apr 2023 07:02  Patient On (Oxygen Delivery Method): room air      CONSTITUTIONAL: NAD, well-developed, well-groomed  EYES: PERRLA; conjunctiva and sclera clear  ENMT: Moist oral mucosa, no pharyngeal injection or exudates; normal dentition  NECK: Supple, no palpable masses; no thyromegaly  RESPIRATORY: Normal respiratory effort; lungs are clear to auscultation bilaterally  CARDIOVASCULAR: Regular rate and rhythm, normal S1 and S2, no murmur/rub/gallop; No lower extremity edema; Peripheral pulses are 2+ bilaterally  ABDOMEN: Nontender to palpation, normoactive bowel sounds, no rebound/guarding; No hepatosplenomegaly  MUSCULOSKELETAL: no clubbing or cyanosis of digits; no joint swelling or tenderness to palpation  PSYCH: A+O to person, place, and time; affect appropriate  NEUROLOGY: CN 2-12 are intact and symmetric; no gross sensory deficits   SKIN: No rashes; no palpable lesions

## 2023-04-04 NOTE — PROGRESS NOTE ADULT - PROBLEM SELECTOR PLAN 2
SCr 1.61 on admission (baseline SCr 1.11), possibly pre-renal OLIVER 2/2 renal hypoperfusion from GI bleeding vs. ATN; improving s/p IVFs and 2U PRBC  - FENa 0.7%, c/w pre-renal OLIVER  - avoid nephrotoxins, renally dose medications as per GFR  - monitor I/Os, UOP, SCr

## 2023-04-04 NOTE — DISCHARGE NOTE PROVIDER - NSDCMRMEDTOKEN_GEN_ALL_CORE_FT
atenolol 50 mg oral tablet: 1 orally once a day  atorvastatin 40 mg oral tablet: 1 tab(s) orally once a day  latanoprost 0.005% ophthalmic solution: 1 drop(s) in each eye once a day  levothyroxine 150 mcg (0.15 mg) oral capsule: 1 orally once a day in the morning with breakfast  losartan 100 mg oral tablet: 1 tab(s) orally once a day  Multiple Vitamins oral tablet: 1 tab(s) orally once a day  Neurontin 100 mg oral capsule: 1 cap(s) orally 2 times a day  pramipexole 0.75 mg oral tablet: 1 tab(s) orally 3 times a day  traZODone 50 mg oral tablet: 1 tab(s) orally once a day (at bedtime)  triamterene-hydrochlorothiazide 37.5 mg-25 mg oral capsule: 1 cap(s) orally once a day  Vitamin D3 1000 intl units oral capsule: 1 cap(s) orally once a day   atenolol 50 mg oral tablet: 1 orally once a day  atorvastatin 40 mg oral tablet: 1 tab(s) orally once a day  latanoprost 0.005% ophthalmic solution: 1 drop(s) in each eye once a day  levothyroxine 150 mcg (0.15 mg) oral capsule: 1 orally once a day in the morning with breakfast  losartan 100 mg oral tablet: 1 tab(s) orally once a day  Multiple Vitamins oral tablet: 1 tab(s) orally once a day  Neurontin 100 mg oral capsule: 1 cap(s) orally 2 times a day  pantoprazole 40 mg oral delayed release tablet: 1 tab(s) orally once a day (before a meal)  pramipexole 0.75 mg oral tablet: 1 tab(s) orally 3 times a day  traZODone 50 mg oral tablet: 1 tab(s) orally once a day (at bedtime)  triamterene-hydrochlorothiazide 37.5 mg-25 mg oral capsule: 1 cap(s) orally once a day  Vitamin D3 1000 intl units oral capsule: 1 cap(s) orally once a day

## 2023-04-04 NOTE — DISCHARGE NOTE PROVIDER - CARE PROVIDER_API CALL
Nghia Duarte  INTERNAL MEDICINE  [address] [place] [id number]  Phone: [phone]  Fax: [phone]  Established Patient  Follow Up Time: 2 weeks

## 2023-04-04 NOTE — PROGRESS NOTE ADULT - ASSESSMENT
88F w/ HTN, HLD, hypothyroidism, Parkinson's disease, sciatica who presents w/ multiple episodes of mixed melena/bright red rectal bleeding after NSAID use, found to have symptomatic anemia s/p PRBC transfusion, currently admitted for GI bleeding pending upper endoscopy +/- colonoscopy with GI. Pt. also with OLIVER and left lower lobe consolidation, currently stable. 88F w/ HTN, HLD, hypothyroidism, Parkinson's disease, sciatica who presents w/ multiple episodes of mixed melena/bright red rectal bleeding after NSAID use, found to have symptomatic anemia s/p PRBC transfusion, currently admitted for GI bleeding s/p EGD 4/3 showing gastritis/duodenitis, currently stable. Patient also w/ improving OLIVER and stable LLL consolidation.

## 2023-04-04 NOTE — PHYSICAL THERAPY INITIAL EVALUATION ADULT - PERTINENT HX OF CURRENT PROBLEM, REHAB EVAL
Pt is an 88 year old female who presented to hospital with mixed melena/bright red rectal bleeding found to have symptomatic anemia. Past medical history: Parkinson's disease, sciatica, further history below.

## 2023-04-05 ENCOUNTER — NON-APPOINTMENT (OUTPATIENT)
Age: 88
End: 2023-04-05

## 2023-04-05 LAB — SURGICAL PATHOLOGY STUDY: SIGNIFICANT CHANGE UP

## 2023-07-21 NOTE — ED ADULT NURSE NOTE - NS ED NURSE LEVEL OF CONSCIOUSNESS ORIENTATION
Oriented - self; Oriented - place; Oriented - time Odomzo Counseling- I discussed with the patient the risks of Odomzo including but not limited to nausea, vomiting, diarrhea, constipation, weight loss, changes in the sense of taste, decreased appetite, muscle spasms, and hair loss.  The patient verbalized understanding of the proper use and possible adverse effects of Odomzo.  All of the patient's questions and concerns were addressed.

## 2023-12-11 PROBLEM — E03.9 HYPOTHYROIDISM, UNSPECIFIED: Chronic | Status: ACTIVE | Noted: 2023-04-02

## 2023-12-11 PROBLEM — M54.30 SCIATICA, UNSPECIFIED SIDE: Chronic | Status: ACTIVE | Noted: 2023-04-02

## 2023-12-11 PROBLEM — E78.5 HYPERLIPIDEMIA, UNSPECIFIED: Chronic | Status: ACTIVE | Noted: 2023-04-02

## 2023-12-11 PROBLEM — G20 PARKINSON'S DISEASE: Chronic | Status: ACTIVE | Noted: 2023-04-02

## 2023-12-29 ENCOUNTER — APPOINTMENT (OUTPATIENT)
Dept: ORTHOPEDIC SURGERY | Facility: CLINIC | Age: 88
End: 2023-12-29

## 2025-04-25 NOTE — PROGRESS NOTE ADULT - PROVIDER SPECIALTY LIST ADULT
Gastroenterology
Hospitalist

## (undated) DEVICE — DRSG 2X2

## (undated) DEVICE — UNDERPAD LINEN SAVER 17 X 24"

## (undated) DEVICE — LUBRICATING JELLY HR ONE SHOT 3G

## (undated) DEVICE — TUBING IV SET GRAVITY 3Y 100" MACRO

## (undated) DEVICE — GOWN LG

## (undated) DEVICE — DRSG CURITY GAUZE SPONGE 4 X 4" 12-PLY NON-STERILE

## (undated) DEVICE — PACK IV START WITH CHG

## (undated) DEVICE — CONTAINER FORMALIN 10% 20ML

## (undated) DEVICE — BIOPSY FORCEP COLD DISP

## (undated) DEVICE — ELCTR ECG CONDUCTIVE ADHESIVE

## (undated) DEVICE — LINE BREATHE SAMPLNG

## (undated) DEVICE — SALIVA EJECTOR (BLUE)

## (undated) DEVICE — CATH IV SAFE BC 22G X 1" (BLUE)

## (undated) DEVICE — DENTURE CUP PINK

## (undated) DEVICE — TUBING MEDI-VAC W MAXIGRIP CONNECTORS 1/4"X6'

## (undated) DEVICE — BASIN EMESIS 10IN GRADUATED MAUVE

## (undated) DEVICE — DRSG BANDAID 0.75X3"

## (undated) DEVICE — BITE BLOCK ADULT 20 X 27MM (GREEN)

## (undated) DEVICE — CLAMP BX HOT RAD JAW 3

## (undated) DEVICE — CONTAINER FORMALIN 80ML YELLOW

## (undated) DEVICE — BIOPSY FORCEP RADIAL JAW 4 STANDARD WITH NEEDLE